# Patient Record
Sex: MALE | Race: BLACK OR AFRICAN AMERICAN | Employment: FULL TIME | URBAN - METROPOLITAN AREA
[De-identification: names, ages, dates, MRNs, and addresses within clinical notes are randomized per-mention and may not be internally consistent; named-entity substitution may affect disease eponyms.]

---

## 2017-01-09 RX ORDER — BACLOFEN 10 MG/1
TABLET ORAL
Qty: 60 TAB | Refills: 0 | Status: SHIPPED | OUTPATIENT
Start: 2017-01-09 | End: 2017-03-15

## 2017-01-09 RX ORDER — GLIMEPIRIDE 2 MG/1
TABLET ORAL
Qty: 30 TAB | Refills: 0 | Status: SHIPPED | OUTPATIENT
Start: 2017-01-09 | End: 2017-02-08 | Stop reason: SDUPTHER

## 2017-01-23 RX ORDER — METFORMIN HYDROCHLORIDE 500 MG/1
TABLET, EXTENDED RELEASE ORAL
Qty: 120 TAB | Refills: 0 | Status: SHIPPED | OUTPATIENT
Start: 2017-01-23 | End: 2017-02-27 | Stop reason: SDUPTHER

## 2017-02-08 RX ORDER — GLIMEPIRIDE 2 MG/1
TABLET ORAL
Qty: 30 TAB | Refills: 0 | Status: SHIPPED | OUTPATIENT
Start: 2017-02-08 | End: 2017-03-21 | Stop reason: SDUPTHER

## 2017-02-28 RX ORDER — METFORMIN HYDROCHLORIDE 500 MG/1
TABLET, EXTENDED RELEASE ORAL
Qty: 120 TAB | Refills: 0 | Status: SHIPPED | OUTPATIENT
Start: 2017-02-28 | End: 2017-04-06 | Stop reason: SDUPTHER

## 2017-03-03 ENCOUNTER — OFFICE VISIT (OUTPATIENT)
Dept: FAMILY MEDICINE CLINIC | Age: 57
End: 2017-03-03

## 2017-03-03 VITALS
TEMPERATURE: 97.9 F | HEIGHT: 66 IN | DIASTOLIC BLOOD PRESSURE: 88 MMHG | OXYGEN SATURATION: 98 % | BODY MASS INDEX: 27.98 KG/M2 | WEIGHT: 174.13 LBS | SYSTOLIC BLOOD PRESSURE: 128 MMHG | HEART RATE: 60 BPM | RESPIRATION RATE: 16 BRPM

## 2017-03-03 DIAGNOSIS — I10 ESSENTIAL HYPERTENSION: ICD-10-CM

## 2017-03-03 DIAGNOSIS — R00.2 PALPITATION: ICD-10-CM

## 2017-03-03 DIAGNOSIS — E78.00 HYPERCHOLESTEROLEMIA: ICD-10-CM

## 2017-03-03 DIAGNOSIS — E11.9 CONTROLLED TYPE 2 DIABETES MELLITUS WITHOUT COMPLICATION, WITHOUT LONG-TERM CURRENT USE OF INSULIN (HCC): Primary | ICD-10-CM

## 2017-03-03 RX ORDER — TOFACITINIB 11 MG/1
TABLET, FILM COATED, EXTENDED RELEASE ORAL
COMMUNITY
Start: 2017-03-01 | End: 2017-08-10

## 2017-03-03 NOTE — PROGRESS NOTES
HISTORY OF PRESENT ILLNESS  Bigg Goodwin is a 62 y.o. male. HPI  Cardiovascular Review:  The patient has hypertension and hyperlipidemia. Diet and Lifestyle: generally follows a low fat low cholesterol diet, generally follows a low sodium diet, sedentary, nonsmoker  Home BP Monitoring: is not measured at home. He admits to periods at night that he can feel heart beating hard without exertion, has not had cardiac work up in years. Not chest pain. Pertinent ROS: taking medications as instructed, no medication side effects noted, no TIA's, no chest pain on exertion, no dyspnea on exertion, no swelling of ankles. Diabetes Mellitus:  He has diabetes mellitus, and  hypertension and hyperlipidemia. Diabetic ROS - medication compliance: compliant all of the time, diabetic diet compliance: compliant most of the time, home glucose monitoring: is not performed. Lab review: orders written for new lab studies as appropriate; see orders. Patient Active Problem List    Diagnosis Date Noted    HTN (hypertension) 11/18/2014    Anxiety 09/03/2014    Diabetes mellitus type 2, controlled ([facility] 03/13/2012    Hypercholesterolemia 02/15/2011    [facility] 05/25/2010    ED (erectile dysfunction) 05/25/2010     Current Outpatient Prescriptions   Medication Sig Dispense Refill    XELJANZ XR 11 mg Tb24       metFORMIN ER (GLUCOPHAGE XR) 500 mg tablet TAKE TWO TABLETS BY MOUTH TWICE DAILY BEFORE  BREAKFAST  AND  DINNER 120 Tab 0    glimepiride (AMARYL) 2 mg tablet TAKE ONE TABLET BY MOUTH ONCE DAILY BEFORE  DINNER 30 Tab 0    baclofen (LIORESAL) 10 mg tablet TAKE ONE TABLET BY MOUTH THREE TIMES DAILY AS NEEDED FOR MUSCLE SPASM 60 Tab 0    canagliflozin (INVOKANA) 300 mg tablet Take 1 Tab by mouth Daily (before breakfast). 30 Tab 5    atorvastatin (LIPITOR) 20 mg tablet TAKE ONE TABLET BY MOUTH ONCE DAILY 30 Tab 5    lisinopril (PRINIVIL, ZESTRIL) 20 mg tablet Take 1 Tab by mouth daily.  30 Tab 5    valACYclovir (VALTREX) 500 mg tablet Take 1 Tab by mouth daily. 30 Tab 5    glucose blood VI test strips (ASCENSIA AUTODISC VI, ONE TOUCH ULTRA TEST VI) strip Bid monitoring dx: 250.00 100 Strip 11    Lancets Misc Monitor twice daily before meals dx: 250.00 2 Package 11    Blood-Glucose Meter (ONE TOUCH ULTRA SMART) monitoring kit by Does Not Apply route. Dx 250.00 1 Kit 0    zolpidem (AMBIEN) 10 mg tablet Take 1 Tab by mouth nightly as needed for Sleep. 27 Tab 0     Family History   Problem Relation Age of Onset    No Known Problems Mother     Arthritis-rheumatoid Father      Social History   Substance Use Topics    Smoking status: Former Smoker     Years: 2.00     Types: Cigarettes     Quit date: 10/10/2009    Smokeless tobacco: Never Used    Alcohol use 0.0 oz/week     0 Standard drinks or equivalent per week      Comment: occas. ROS  A comprehensive review of system was obtained and negative except findings in the HPI    Visit Vitals    /88    Pulse 60    Temp 97.9 °F (36.6 °C) (Oral)    Resp 16    Ht 5' 6\" (1.676 m)    Wt 174 lb 2 oz (79 kg)    SpO2 98%    BMI 28.1 kg/m2     Physical Exam   Constitutional: He is oriented to person, place, and time. He appears well-developed and well-nourished. No distress. Cardiovascular: Normal rate and regular rhythm. No murmur heard. Pulmonary/Chest: Breath sounds normal. No respiratory distress. He has no wheezes. Musculoskeletal: He exhibits no edema. Neurological: He is alert and oriented to person, place, and time. Psychiatric: He has a normal mood and affect. Nursing note and vitals reviewed. ASSESSMENT and PLAN  Encounter Diagnoses   Name Primary?     Controlled type 2 diabetes mellitus without complication, without long-term current use of insulin (HCC) Yes    Hypercholesterolemia     Essential hypertension     Palpitation      Orders Placed This Encounter    HEMOGLOBIN A1C WITH EAG    LIPID PANEL    METABOLIC PANEL, COMPREHENSIVE    CBC WITH AUTOMATED DIFF    MICROALBUMIN, UR, RAND W/ MICROALBUMIN/CREA RATIO    REFERRAL TO CARDIOLOGY    AMB POC EKG ROUTINE W/ 12 LEADS, INTER & REP    XELJANZ XR 11 mg Tb24     All labs updated today  EKG does show flattened T waves, will refer to cardio for eval  I have discussed the diagnosis with the patient and the intended plan as seen in the above orders. The patient has received an after-visit summary and questions were answered concerning future plans. Patient conveyed understanding of the plan at the time of the visit.     Honey Wang, MSN, ANP  3/3/2017

## 2017-03-03 NOTE — MR AVS SNAPSHOT
Visit Information Date & Time Provider Department Dept. Phone Encounter #  
 3/3/2017  8:15 AM Sarai Melvin, [facility] [phone] [id number] Upcoming Health Maintenance Date Due Hepatitis C Screening 1960 Pneumococcal 19-64 Medium Risk (1 of 1 - PPSV23) 1/12/1979 DTaP/Tdap/Td series (1 - Tdap) 1/12/1981 EYE EXAM RETINAL OR DILATED Q1 11/1/2013 FOOT EXAM Q1 11/27/2013 INFLUENZA AGE 9 TO ADULT 8/1/2016 HEMOGLOBIN A1C Q6M 3/28/2017 MICROALBUMIN Q1 9/28/2017 LIPID PANEL Q1 9/28/2017 COLONOSCOPY 7/23/2025 Allergies as of 3/3/2017  Review Complete On: 3/3/2017 By: Katty Hyatt LPN No Known Allergies Current Immunizations  Never Reviewed No immunizations on file. Not reviewed this visit You Were Diagnosed With   
  
 Codes Comments Controlled type 2 diabetes mellitus without complication, without long-term current use of insulin [facility]    -  Primary ICD-10-CM: E11.9 ICD-9-CM: 250.00 Hypercholesterolemia     ICD-10-CM: E78.00 ICD-9-CM: 272.0 Essential hypertension     ICD-10-CM: I10 
ICD-9-CM: 401.9 Palpitation     ICD-10-CM: R00.2 ICD-9-CM: 785.1 Vitals BP  
  
  
  
  
  
 128/88 Vitals History BMI and BSA Data Body Mass Index Body Surface Area  
 28.1 kg/m 2 1.92 m 2 Preferred Pharmacy Pharmacy Name Phone [facility] [address] [phone] Your Updated Medication List  
  
   
This list is accurate as of: 3/3/17  8:43 AM.  Always use your most recent med list.  
  
  
  
  
 atorvastatin 20 mg tablet Commonly known as:  LIPITOR  
TAKE ONE TABLET BY MOUTH ONCE DAILY  
  
 baclofen 10 mg tablet Commonly known as:  LIORESAL  
TAKE ONE TABLET BY MOUTH THREE TIMES DAILY AS NEEDED FOR MUSCLE SPASM Blood-Glucose Meter monitoring kit Commonly known as:  ONETOUCH ULTRA SMART by Does Not Apply route. Dx 250.00  
  
 canagliflozin 300 mg tablet Commonly known as:  Carlos Dyke Take 1 Tab by mouth Daily (before breakfast). glimepiride 2 mg tablet Commonly known as:  AMARYL  
TAKE ONE TABLET BY MOUTH ONCE DAILY BEFORE  DINNER  
  
 glucose blood VI test strips strip Commonly known as:  ASCENSIA AUTODISC VI, ONE TOUCH ULTRA TEST VI Bid monitoring dx: 250.00 Lancets Misc Monitor twice daily before meals dx: 250.00  
  
 lisinopril 20 mg tablet Commonly known as:  Kita Pinch Take 1 Tab by mouth daily. metFORMIN  mg tablet Commonly known as:  GLUCOPHAGE XR  
TAKE TWO TABLETS BY MOUTH TWICE DAILY BEFORE  BREAKFAST  AND  DINNER  
  
 valACYclovir 500 mg tablet Commonly known as:  VALTREX Take 1 Tab by mouth daily. XELJANZ XR 11 mg Tb24 Generic drug:  tofacitinib  
  
 zolpidem 10 mg tablet Commonly known as:  AMBIEN Take 1 Tab by mouth nightly as needed for Sleep. We Performed the Following AMB POC EKG ROUTINE W/ 12 LEADS, INTER & REP [97900 CPT(R)] CBC WITH AUTOMATED DIFF [27002 CPT(R)] HEMOGLOBIN A1C WITH EAG [68292 CPT(R)] LIPID PANEL [37423 CPT(R)] METABOLIC PANEL, COMPREHENSIVE [99428 CPT(R)] MICROALBUMIN, UR, RAND W/ MICROALBUMIN/CREA RATIO T5344406 CPT(R)] REFERRAL TO CARDIOLOGY [ZQD02 Custom] Comments:  
 Please evaluate patient for palpitations. Referral Information Referral ID Referred By Referred To  
  
 [id number] [address] [place] [address] Phone: [phone] Fax: [phone] Visits Status Start Date End Date 99 New Request 3/3/17 3/3/18 If your referral has a status of pending review or denied, additional information will be sent to support the outcome of this decision. Introducing [facility]! Dear Jolly Gilliam: Thank you for requesting a [employer] account. Our records indicate that you already have an active [employer] account. You can access your account anytime at [email or web address] Did you know that you can access your hospital and ER discharge instructions at any time in [employer]? You can also review all of your test results from your hospital stay or ER visit. Additional Information If you have questions, please visit the Frequently Asked Questions section of the [employer] website at [email or web address] Remember, [employer] is NOT to be used for urgent needs. For medical emergencies, dial 911. Now available from your iPhone and Android! Please provide this summary of care documentation to your next provider. Your primary care clinician is listed as Arielle Ly. If you have any questions after today's visit, please call [phone].

## 2017-03-03 NOTE — PROGRESS NOTES
1. Have you been to the ER, urgent care clinic since your last visit? Hospitalized since your last visit? No    2. Have you seen or consulted any other health care providers outside of the [address] [place] [address] since your last visit? Include any pap smears or colon screening.  Yes Rheumotologist x 2 wks for new med    Chief Complaint   Patient presents with    Follow-up     from Rheumatologist

## 2017-03-04 LAB
ALBUMIN SERPL-MCNC: 4.1 G/DL (ref 3.5–5.5)
ALBUMIN/CREAT UR: 31.5 MG/G CREAT (ref 0–30)
ALBUMIN/GLOB SERPL: 1.7 {RATIO} (ref 1.1–2.5)
ALP SERPL-CCNC: 63 IU/L (ref 39–117)
ALT SERPL-CCNC: 25 IU/L (ref 0–44)
AST SERPL-CCNC: 15 IU/L (ref 0–40)
BASOPHILS # BLD AUTO: 0 X10E3/UL (ref 0–0.2)
BASOPHILS NFR BLD AUTO: 0 %
BILIRUB SERPL-MCNC: 0.3 MG/DL (ref 0–1.2)
BUN SERPL-MCNC: 13 MG/DL (ref 6–24)
BUN/CREAT SERPL: 14 (ref 9–20)
CALCIUM SERPL-MCNC: 8.8 MG/DL (ref 8.7–10.2)
CHLORIDE SERPL-SCNC: 103 MMOL/L (ref 96–106)
CHOLEST SERPL-MCNC: 144 MG/DL (ref 100–199)
CO2 SERPL-SCNC: 22 MMOL/L (ref 18–29)
CREAT SERPL-MCNC: 0.92 MG/DL (ref 0.76–1.27)
CREAT UR-MCNC: 90.6 MG/DL
EOSINOPHIL # BLD AUTO: 0.1 X10E3/UL (ref 0–0.4)
EOSINOPHIL NFR BLD AUTO: 1 %
ERYTHROCYTE [DISTWIDTH] IN BLOOD BY AUTOMATED COUNT: 14 % (ref 12.3–15.4)
EST. AVERAGE GLUCOSE BLD GHB EST-MCNC: 123 MG/DL
GLOBULIN SER CALC-MCNC: 2.4 G/DL (ref 1.5–4.5)
GLUCOSE SERPL-MCNC: 140 MG/DL (ref 65–99)
HBA1C MFR BLD: 5.9 % (ref 4.8–5.6)
HCT VFR BLD AUTO: 39.3 % (ref 37.5–51)
HDLC SERPL-MCNC: 51 MG/DL
HGB BLD-MCNC: 12.8 G/DL (ref 12.6–17.7)
IMM GRANULOCYTES # BLD: 0 X10E3/UL (ref 0–0.1)
IMM GRANULOCYTES NFR BLD: 0 %
INTERPRETATION, 910389: NORMAL
LDLC SERPL CALC-MCNC: 81 MG/DL (ref 0–99)
LYMPHOCYTES # BLD AUTO: 1.1 X10E3/UL (ref 0.7–3.1)
LYMPHOCYTES NFR BLD AUTO: 14 %
Lab: NORMAL
MCH RBC QN AUTO: 28.5 PG (ref 26.6–33)
MCHC RBC AUTO-ENTMCNC: 32.6 G/DL (ref 31.5–35.7)
MCV RBC AUTO: 88 FL (ref 79–97)
MICROALBUMIN UR-MCNC: 28.5 UG/ML
MONOCYTES # BLD AUTO: 0.5 X10E3/UL (ref 0.1–0.9)
MONOCYTES NFR BLD AUTO: 6 %
NEUTROPHILS # BLD AUTO: 6 X10E3/UL (ref 1.4–7)
NEUTROPHILS NFR BLD AUTO: 79 %
PLATELET # BLD AUTO: 205 X10E3/UL (ref 150–379)
POTASSIUM SERPL-SCNC: 4.3 MMOL/L (ref 3.5–5.2)
PROT SERPL-MCNC: 6.5 G/DL (ref 6–8.5)
RBC # BLD AUTO: 4.49 X10E6/UL (ref 4.14–5.8)
SODIUM SERPL-SCNC: 141 MMOL/L (ref 134–144)
TRIGL SERPL-MCNC: 62 MG/DL (ref 0–149)
VLDLC SERPL CALC-MCNC: 12 MG/DL (ref 5–40)
WBC # BLD AUTO: 7.6 X10E3/UL (ref 3.4–10.8)

## 2017-03-15 ENCOUNTER — OFFICE VISIT (OUTPATIENT)
Dept: CARDIOLOGY CLINIC | Age: 57
End: 2017-03-15

## 2017-03-15 VITALS
WEIGHT: 176.2 LBS | BODY MASS INDEX: 28.32 KG/M2 | HEIGHT: 66 IN | SYSTOLIC BLOOD PRESSURE: 150 MMHG | DIASTOLIC BLOOD PRESSURE: 90 MMHG | HEART RATE: 62 BPM | RESPIRATION RATE: 18 BRPM

## 2017-03-15 DIAGNOSIS — R00.2 PALPITATIONS: ICD-10-CM

## 2017-03-15 DIAGNOSIS — I10 ESSENTIAL HYPERTENSION: Primary | ICD-10-CM

## 2017-03-15 DIAGNOSIS — E78.00 HYPERCHOLESTEROLEMIA: ICD-10-CM

## 2017-03-15 RX ORDER — IBUPROFEN 200 MG
TABLET ORAL DAILY
COMMUNITY
End: 2017-04-26 | Stop reason: SDUPTHER

## 2017-03-15 NOTE — MR AVS SNAPSHOT
Visit Information Date & Time Provider Department Dept. Phone Encounter #  
 3/15/2017  9:40 AM Franca Olivarez MD [facility] [place] [phone] [id number] Upcoming Health Maintenance Date Due Hepatitis C Screening 1960 Pneumococcal 19-64 Medium Risk (1 of 1 - PPSV23) 1/12/1979 DTaP/Tdap/Td series (1 - Tdap) 1/12/1981 EYE EXAM RETINAL OR DILATED Q1 11/1/2013 FOOT EXAM Q1 11/27/2013 INFLUENZA AGE 9 TO ADULT 8/1/2016 HEMOGLOBIN A1C Q6M 9/3/2017 MICROALBUMIN Q1 3/3/2018 LIPID PANEL Q1 3/3/2018 COLONOSCOPY 7/23/2025 Allergies as of 3/15/2017  Review Complete On: 3/15/2017 By: Franca Olivarez MD  
 No Known Allergies Current Immunizations  Never Reviewed No immunizations on file. Not reviewed this visit Vitals BP Pulse Resp Height(growth percentile) Weight(growth percentile) BMI  
 150/90 (BP 1 Location: Right arm, BP Patient Position: Sitting) 62 18 5' 6\" (1.676 m) 176 lb 3.2 oz (79.9 kg) 28.44 kg/m2 Smoking Status Former Smoker Vitals History BMI and BSA Data Body Mass Index Body Surface Area  
 28.44 kg/m 2 1.93 m 2 Preferred Pharmacy Pharmacy Name Phone [facility] [address] [place] [phone] Your Updated Medication List  
  
   
This list is accurate as of: 3/15/17 10:04 AM.  Always use your most recent med list.  
  
  
  
  
 atorvastatin 20 mg tablet Commonly known as:  LIPITOR  
TAKE ONE TABLET BY MOUTH ONCE DAILY  
  
 glimepiride 2 mg tablet Commonly known as:  AMARYL  
TAKE ONE TABLET BY MOUTH ONCE DAILY BEFORE  DINNER  
  
 ibuprofen 200 mg tablet Commonly known as:  MOTRIN Take  by mouth daily. lisinopril 20 mg tablet Commonly known as:  Cade Conrad Take 1 Tab by mouth daily. metFORMIN  mg tablet Commonly known as:  GLUCOPHAGE XR  
 TAKE TWO TABLETS BY MOUTH TWICE DAILY BEFORE  BREAKFAST  AND  DINNER  
  
 XELJANZ XR 11 mg Tb24 Generic drug:  tofacitinib  
  
 zolpidem 10 mg tablet Commonly known as:  AMBIEN Take 1 Tab by mouth nightly as needed for Sleep. Introducing [facility]! Dear Dmeetrio Castillo: 
Thank you for requesting a [employer] account. Our records indicate that you already have an active [employer] account. You can access your account anytime at [email or web address] Did you know that you can access your hospital and ER discharge instructions at any time in [employer]? You can also review all of your test results from your hospital stay or ER visit. Additional Information If you have questions, please visit the Frequently Asked Questions section of the [employer] website at [email or web address] Remember, [employer] is NOT to be used for urgent needs. For medical emergencies, dial 911. Now available from your iPhone and Android! Please provide this summary of care documentation to your next provider. Your primary care clinician is listed as Honey Wang. If you have any questions after today's visit, please call [phone].

## 2017-03-15 NOTE — PROGRESS NOTES
Visit Vitals    /90 (BP 1 Location: Right arm, BP Patient Position: Sitting)    Pulse 62    Resp 18    Ht 5' 6\" (1.676 m)    Wt 176 lb 3.2 oz (79.9 kg)    BMI 28.44 kg/m2     Reviewed and updated medications per Dr. Sienna Jackson.

## 2017-03-15 NOTE — PROGRESS NOTES
Lito Jim MD. [facility]              Patient: Olivia Cousin  : 1960      Today's Date: 3/15/2017            HISTORY OF PRESENT ILLNESS:     History of Present Illness:    Mr. Grupo Corley was referred for HTN and palpitations. He says late at night, watching TV he feels heart beats hard - maybe a little faster - he thinks it started after he started "[employer]" for RA - happens most night - lasts up to 30 min. Activity is limited due to RA, but still walks. No CP or SOB. PAST MEDICAL HISTORY:     Past Medical History:   Diagnosis Date    Diabetes [facility])     ED (erectile dysfunction) 2010    Hypertension     RA (rheumatoid arthritis) ([facility] 2010           MEDICATIONS:     Current Outpatient Prescriptions   Medication Sig Dispense Refill    ibuprofen (MOTRIN) 200 mg tablet Take  by mouth daily.  XELJANZ XR 11 mg Tb24       metFORMIN ER (GLUCOPHAGE XR) 500 mg tablet TAKE TWO TABLETS BY MOUTH TWICE DAILY BEFORE  BREAKFAST  AND  DINNER 120 Tab 0    glimepiride (AMARYL) 2 mg tablet TAKE ONE TABLET BY MOUTH ONCE DAILY BEFORE  DINNER 30 Tab 0    zolpidem (AMBIEN) 10 mg tablet Take 1 Tab by mouth nightly as needed for Sleep. 30 Tab 0    atorvastatin (LIPITOR) 20 mg tablet TAKE ONE TABLET BY MOUTH ONCE DAILY 30 Tab 5    lisinopril (PRINIVIL, ZESTRIL) 20 mg tablet Take 1 Tab by mouth daily. 30 Tab 5       No Known Allergies          SOCIAL HISTORY:     Social History   Substance Use Topics    Smoking status: Former Smoker     Years: 2.00     Types: Cigarettes     Quit date: 10/10/2009    Smokeless tobacco: Never Used    Alcohol use 0.0 oz/week     0 Standard drinks or equivalent per week      Comment: occas.            FAMILY HISTORY:     Family History   Problem Relation Age of Onset    No Known Problems Mother     Arthritis-rheumatoid Father              REVIEW OF SYMPTOMS:     Review of Symptoms:  Constitutional: Negative for fever, chills  HEENT: Negative for nosebleeds, tinnitus, and vision changes. Respiratory: Negative for cough, wheezing  Cardiovascular: Negative for orthopnea, claudication, syncope, and PND. Gastrointestinal: Negative for abdominal pain, diarrhea, melena. Genitourinary: Negative for dysuria  Musculoskeletal: + joint pain   Skin: Negative for rash  Heme: No problems bleeding. Neurological: Negative for speech change and focal weakness. PHYSICAL EXAM:     Physical Exam:  Visit Vitals    /90 (BP 1 Location: Right arm, BP Patient Position: Sitting)    Pulse 62    Resp 18    Ht 5' 6\" (1.676 m)    Wt 176 lb 3.2 oz (79.9 kg)    BMI 28.44 kg/m2     Patient appears generally well, mood and affect are appropriate and pleasant. HEENT:  Hearing intact, non-icteric, normocephalic, atraumatic. Neck Exam: Supple, No JVD or carotid bruits. Lung Exam: Clear to auscultation, even breath sounds. Cardiac Exam: Regular rate and rhythm with no murmur  Abdomen: Soft, non-tender, normal bowel sounds. No bruits or masses. Extremities: Moves all ext well. No lower extremity edema. Vascular: 2+ dorsalis pedis pulses bilaterally. Psych: Appropriate affect  Neuro - Grossly intact            LABS / OTHER STUDIES:       Lab Results   Component Value Date/Time    Sodium 141 03/03/2017 08:44 AM    Potassium 4.3 03/03/2017 08:44 AM    Chloride 103 03/03/2017 08:44 AM    CO2 22 03/03/2017 08:44 AM    Anion gap 10 08/17/2010 09:04 AM    Glucose 140 03/03/2017 08:44 AM    BUN 13 03/03/2017 08:44 AM    Creatinine 0.92 03/03/2017 08:44 AM    BUN/Creatinine ratio 14 03/03/2017 08:44 AM    GFR est  03/03/2017 08:44 AM    GFR est non-AA 92 03/03/2017 08:44 AM    Calcium 8.8 03/03/2017 08:44 AM    Bilirubin, total 0.3 03/03/2017 08:44 AM    AST (SGOT) 15 03/03/2017 08:44 AM    Alk.  phosphatase 63 03/03/2017 08:44 AM    Protein, total 6.5 03/03/2017 08:44 AM    Albumin 4.1 03/03/2017 08:44 AM    Globulin 3.7 01/27/2010 10:01 AM    A-G Ratio 1.7 03/03/2017 08:44 AM    ALT (SGPT) 25 03/03/2017 08:44 AM     Lab Results   Component Value Date/Time    WBC 7.6 03/03/2017 08:44 AM    HGB 12.8 03/03/2017 08:44 AM    HCT 39.3 03/03/2017 08:44 AM    PLATELET 056 99/04/0727 08:44 AM    MCV 88 03/03/2017 08:44 AM     Lab Results   Component Value Date/Time    Cholesterol, total 144 03/03/2017 08:44 AM    HDL Cholesterol 51 03/03/2017 08:44 AM    LDL, calculated 81 03/03/2017 08:44 AM    VLDL, calculated 12 03/03/2017 08:44 AM    Triglyceride 62 03/03/2017 08:44 AM    CHOL/HDL Ratio 5.9 01/27/2010 10:01 AM       Lab Results   Component Value Date/Time    TSH 0.895 09/28/2016 08:27 AM     Lab Results   Component Value Date/Time    Hemoglobin A1c 5.9 03/03/2017 08:44 AM             CARDIAC DIAGNOSTICS:     Cardiac Evaluation Includes:    Exercise Cardiolite 2/10 - No evidence of myocardium at ischemic risk.  Normal wall motion  with an EF of 61 %. EKG 3/3/17- NSR, non-specific T wave abn           ASSESSMENT AND PLAN:     Assessment and Plan:  1) Palpitations (heart pounding at night time)   - check an echo to assess heart structure   - check 30 day event monitor (he does not have symptoms daily) - he wants further testing to see what is going on     2) CV risk management   - He has higher CV risk given DM and RA  - cont statin for primary prevention   - consider ASA 81 mg daily if he is able to get off of routine NSAIDS    3) HTN  - BP he says usually OK, although a little high today  - consider adding Coreg if BP remains high (would also help palpitations)     4) Phone FU after testing. See me in 6 weeks. Patient expressed understanding of the plan - questions were answered. He is from [place], [place]. Nitish Lyons MD, [address], [facility] [address]  Ph: [phone]   Ph [phone]        ADDENDUM   4/21/2017  Echo 4/19/17 - mild LVH, LVEF 65-70%    Will have nurse call with echo results.

## 2017-03-16 ENCOUNTER — OFFICE VISIT (OUTPATIENT)
Dept: CARDIOLOGY CLINIC | Age: 57
End: 2017-03-16

## 2017-03-16 DIAGNOSIS — R00.2 PALPITATIONS: Primary | ICD-10-CM

## 2017-03-22 RX ORDER — GLIMEPIRIDE 2 MG/1
TABLET ORAL
Qty: 30 TAB | Refills: 0 | Status: SHIPPED | OUTPATIENT
Start: 2017-03-22 | End: 2017-04-26 | Stop reason: SDUPTHER

## 2017-04-06 RX ORDER — ATORVASTATIN CALCIUM 20 MG/1
TABLET, FILM COATED ORAL
Qty: 30 TAB | Refills: 0 | Status: SHIPPED | OUTPATIENT
Start: 2017-04-06 | End: 2017-05-09 | Stop reason: SDUPTHER

## 2017-04-06 RX ORDER — METFORMIN HYDROCHLORIDE 500 MG/1
TABLET, EXTENDED RELEASE ORAL
Qty: 120 TAB | Refills: 0 | Status: SHIPPED | OUTPATIENT
Start: 2017-04-06 | End: 2017-05-09 | Stop reason: SDUPTHER

## 2017-04-19 ENCOUNTER — CLINICAL SUPPORT (OUTPATIENT)
Dept: CARDIOLOGY CLINIC | Age: 57
End: 2017-04-19

## 2017-04-19 DIAGNOSIS — E78.00 HYPERCHOLESTEROLEMIA: ICD-10-CM

## 2017-04-19 DIAGNOSIS — R00.2 PALPITATIONS: ICD-10-CM

## 2017-04-19 DIAGNOSIS — I10 ESSENTIAL HYPERTENSION: ICD-10-CM

## 2017-04-24 ENCOUNTER — TELEPHONE (OUTPATIENT)
Dept: CARDIOLOGY CLINIC | Age: 57
End: 2017-04-24

## 2017-04-26 ENCOUNTER — OFFICE VISIT (OUTPATIENT)
Dept: CARDIOLOGY CLINIC | Age: 57
End: 2017-04-26

## 2017-04-26 VITALS
BODY MASS INDEX: 28.28 KG/M2 | HEIGHT: 66 IN | DIASTOLIC BLOOD PRESSURE: 90 MMHG | WEIGHT: 176 LBS | HEART RATE: 69 BPM | OXYGEN SATURATION: 95 % | RESPIRATION RATE: 19 BRPM | SYSTOLIC BLOOD PRESSURE: 140 MMHG

## 2017-04-26 DIAGNOSIS — R00.2 PALPITATIONS: ICD-10-CM

## 2017-04-26 DIAGNOSIS — I10 ESSENTIAL HYPERTENSION: Primary | ICD-10-CM

## 2017-04-26 DIAGNOSIS — E78.00 HYPERCHOLESTEROLEMIA: ICD-10-CM

## 2017-04-26 RX ORDER — IBUPROFEN 200 MG
200 TABLET ORAL
Qty: 30 TAB | Refills: 0
Start: 2017-04-26 | End: 2017-10-30 | Stop reason: DRUGHIGH

## 2017-04-26 RX ORDER — GLIMEPIRIDE 2 MG/1
TABLET ORAL
Qty: 30 TAB | Refills: 0 | Status: SHIPPED | OUTPATIENT
Start: 2017-04-26 | End: 2017-06-01 | Stop reason: SDUPTHER

## 2017-04-26 NOTE — PROGRESS NOTES
Visit Vitals    /83 (BP 1 Location: Left arm, BP Patient Position: Sitting)    Pulse 69    Resp 19    Ht 5' 6\" (1.676 m)    Wt 176 lb (79.8 kg)    SpO2 95%    BMI 28.41 kg/m2

## 2017-04-26 NOTE — PROGRESS NOTES
Doris Pitts MD. [facility]              Patient: Adriana Dotson  : 1960      Today's Date: 2017            HISTORY OF PRESENT ILLNESS:     History of Present Illness:  Mr. Blanca Khoury is here for follow-up. Just minimal palpitations - better. No CP or SOB. He feels well overall. PAST MEDICAL HISTORY:     Past Medical History:   Diagnosis Date    Diabetes [facility])     ED (erectile dysfunction) 2010    Hypertension     RA (rheumatoid arthritis) ([facility] [address] 2010         MEDICATIONS:     Current Outpatient Prescriptions   Medication Sig Dispense Refill    glimepiride (AMARYL) 2 mg tablet TAKE ONE TABLET BY MOUTH ONCE DAILY BEFORE  DINNER 30 Tab 0    ibuprofen (MOTRIN) 200 mg tablet Take 1 Tab by mouth every eight (8) hours as needed for Pain. 30 Tab 0    metFORMIN ER (GLUCOPHAGE XR) 500 mg tablet TAKE TWO TABLETS BY MOUTH TWICE DAILY BEFORE  BREAKFAST  AND  DINNER 120 Tab 0    atorvastatin (LIPITOR) 20 mg tablet TAKE ONE TABLET BY MOUTH ONCE DAILY 30 Tab 0    XELJANZ XR 11 mg Tb24       zolpidem (AMBIEN) 10 mg tablet Take 1 Tab by mouth nightly as needed for Sleep. 30 Tab 0    lisinopril (PRINIVIL, ZESTRIL) 20 mg tablet Take 1 Tab by mouth daily. 30 Tab 5       No Known Allergies          SOCIAL HISTORY:     Social History   Substance Use Topics    Smoking status: Former Smoker     Years: 2.00     Types: Cigarettes     Quit date: 10/10/2009    Smokeless tobacco: Never Used    Alcohol use 0.0 oz/week     0 Standard drinks or equivalent per week      Comment: occas. FAMILY HISTORY:     Family History   Problem Relation Age of Onset    No Known Problems Mother     Arthritis-rheumatoid Father             REVIEW OF SYMPTOMS:      Review of Symptoms:  Constitutional: Negative for fever, chills  HEENT: Negative for nosebleeds, tinnitus, and vision changes.    Respiratory: Negative for cough, wheezing  Cardiovascular: Negative for orthopnea, claudication, syncope, and PND. Gastrointestinal: Negative for abdominal pain, diarrhea, melena. Genitourinary: Negative for dysuria  Musculoskeletal: + joint pain   Skin: Negative for rash  Heme: No problems bleeding. Neurological: Negative for speech change and focal weakness.                  PHYSICAL EXAM:      Physical Exam:  Visit Vitals    /90 (BP 1 Location: Left arm, BP Patient Position: Sitting)    Pulse 69    Resp 19    Ht 5' 6\" (1.676 m)    Wt 176 lb (79.8 kg)    SpO2 95%    BMI 28.41 kg/m2       Patient appears generally well, mood and affect are appropriate and pleasant. HEENT: Hearing intact, non-icteric, normocephalic, atraumatic. Neck Exam: Supple  Lung Exam: Clear to auscultation, even breath sounds. Cardiac Exam: Regular rate and rhythm with no murmur  Abdomen: Soft, non-tender, normal bowel sounds. Extremities: Moves all ext well. No lower extremity edema. Psych: Appropriate affect  Neuro - Grossly intact                 LABS / OTHER STUDIES:               Lab Results   Component Value Date/Time     Sodium 141 03/03/2017 08:44 AM     Potassium 4.3 03/03/2017 08:44 AM     Chloride 103 03/03/2017 08:44 AM     CO2 22 03/03/2017 08:44 AM     Anion gap 10 08/17/2010 09:04 AM     Glucose 140 03/03/2017 08:44 AM     BUN 13 03/03/2017 08:44 AM     Creatinine 0.92 03/03/2017 08:44 AM     BUN/Creatinine ratio 14 03/03/2017 08:44 AM     GFR est  03/03/2017 08:44 AM     GFR est non-AA 92 03/03/2017 08:44 AM     Calcium 8.8 03/03/2017 08:44 AM     Bilirubin, total 0.3 03/03/2017 08:44 AM     AST (SGOT) 15 03/03/2017 08:44 AM     Alk.  phosphatase 63 03/03/2017 08:44 AM     Protein, total 6.5 03/03/2017 08:44 AM     Albumin 4.1 03/03/2017 08:44 AM     Globulin 3.7 01/27/2010 10:01 AM     A-G Ratio 1.7 03/03/2017 08:44 AM     ALT (SGPT) 25 03/03/2017 08:44 AM            Lab Results   Component Value Date/Time     WBC 7.6 03/03/2017 08:44 AM     HGB 12.8 03/03/2017 08:44 AM     HCT 39.3 03/03/2017 08:44 AM     PLATELET 719 17/67/0998 08:44 AM     MCV 88 03/03/2017 08:44 AM            Lab Results   Component Value Date/Time     Cholesterol, total 144 03/03/2017 08:44 AM     HDL Cholesterol 51 03/03/2017 08:44 AM     LDL, calculated 81 03/03/2017 08:44 AM     VLDL, calculated 12 03/03/2017 08:44 AM     Triglyceride 62 03/03/2017 08:44 AM     CHOL/HDL Ratio 5.9 01/27/2010 10:01 AM               Lab Results   Component Value Date/Time     TSH 0.895 09/28/2016 08:27 AM            Lab Results   Component Value Date/Time     Hemoglobin A1c 5.9 03/03/2017 08:44 AM                  CARDIAC DIAGNOSTICS:      Cardiac Evaluation Includes:     Exercise Cardiolite 2/10 - No evidence of myocardium at ischemic risk.  Normal wall motion with an EF of 61 %. Echo 4/19/17 - mild LVH, LVEF 65-70%  Event Monitor 3/20/17-4/18/17 - normal study      EKG 3/3/17- NSR, non-specific T wave abn               ASSESSMENT AND PLAN:      Assessment and Plan:  1) Palpitations (heart pounding at night time)   - He is doing better when I see him 4/26/17 with just minimal palpitations now   - Event Monitor 3/20/17-4/18/17 - normal study      2) CV risk management   - He has higher CV risk given DM and RA  - cont statin for primary prevention   - consider ASA 81 mg daily if he is able to get off of routine NSAIDS     3) HTN  - BP high again today 140/90  - I discussed options and he wants to follow BP more, before adding meds - is to see PCP soon. - If BP runs high, can add Coreg which would help with palpitations  - Follow BP at home - goal < 135/85     4) See me as needed. Patient expressed understanding of the plan - questions were answered. He is from [place], [place]. He is [address], [place], [address] [place] [address], [address] [address]  Ph: [phone]    [phone]

## 2017-06-02 RX ORDER — GLIMEPIRIDE 2 MG/1
TABLET ORAL
Qty: 30 TAB | Refills: 0 | Status: SHIPPED | OUTPATIENT
Start: 2017-06-02 | End: 2017-07-19 | Stop reason: SDUPTHER

## 2017-07-21 RX ORDER — GLIMEPIRIDE 2 MG/1
TABLET ORAL
Qty: 30 TAB | Refills: 0 | Status: SHIPPED | OUTPATIENT
Start: 2017-07-21 | End: 2017-09-11 | Stop reason: SDUPTHER

## 2017-08-10 ENCOUNTER — OFFICE VISIT (OUTPATIENT)
Dept: FAMILY MEDICINE CLINIC | Age: 57
End: 2017-08-10

## 2017-08-10 VITALS
TEMPERATURE: 98.7 F | WEIGHT: 173 LBS | RESPIRATION RATE: 16 BRPM | BODY MASS INDEX: 27.8 KG/M2 | OXYGEN SATURATION: 99 % | SYSTOLIC BLOOD PRESSURE: 133 MMHG | HEART RATE: 65 BPM | DIASTOLIC BLOOD PRESSURE: 85 MMHG | HEIGHT: 66 IN

## 2017-08-10 DIAGNOSIS — K52.9 GASTROENTERITIS: Primary | ICD-10-CM

## 2017-08-10 RX ORDER — ETANERCEPT 50 MG/ML
SOLUTION SUBCUTANEOUS
COMMUNITY
Start: 2017-08-03

## 2017-08-10 RX ORDER — ONDANSETRON 8 MG/1
8 TABLET, ORALLY DISINTEGRATING ORAL
Qty: 15 TAB | Refills: 0 | Status: SHIPPED | OUTPATIENT
Start: 2017-08-10 | End: 2017-10-30

## 2017-08-10 NOTE — MR AVS SNAPSHOT
Visit Information Date & Time Provider Department Dept. Phone Encounter #  
 8/10/2017 10:20 AM Yoni Chiang NP [address] [facility] [phone] [id number] Follow-up Instructions Return if symptoms worsen or fail to improve. Upcoming Health Maintenance Date Due Hepatitis C Screening 1960 Pneumococcal 19-64 Medium Risk (1 of 1 - PPSV23) 1/12/1979 DTaP/Tdap/Td series (1 - Tdap) 1/12/1981 EYE EXAM RETINAL OR DILATED Q1 11/1/2013 FOOT EXAM Q1 11/27/2013 INFLUENZA AGE 9 TO ADULT 8/1/2017 HEMOGLOBIN A1C Q6M 9/3/2017 MICROALBUMIN Q1 3/3/2018 LIPID PANEL Q1 3/3/2018 COLONOSCOPY 7/23/2025 Allergies as of 8/10/2017  Review Complete On: 8/10/2017 By: Yoni Chiang NP No Known Allergies Current Immunizations  Never Reviewed No immunizations on file. Not reviewed this visit You Were Diagnosed With   
  
 Codes Comments Gastroenteritis    -  Primary ICD-10-CM: K52.9 ICD-9-CM: 558. 9 Vitals BP Pulse Temp Resp Height(growth percentile) Weight(growth percentile) 133/85 65 98.7 °F (37.1 °C) (Oral) 16 5' 6\" (1.676 m) 173 lb (78.5 kg) SpO2 BMI Smoking Status 99% 27.92 kg/m2 Former Smoker Vitals History BMI and BSA Data Body Mass Index Body Surface Area  
 27.92 kg/m 2 1.91 m 2 Preferred Pharmacy Pharmacy Name Phone [facility] [address] [place] [address] [phone] Your Updated Medication List  
  
   
This list is accurate as of: 8/10/17 10:36 AM.  Always use your most recent med list.  
  
  
  
  
 atorvastatin 20 mg tablet Commonly known as:  LIPITOR  
TAKE ONE TABLET BY MOUTH ONCE DAILY ENBREL SURECLICK 50 mg/mL (0.86 mL) injection Generic drug:  etanercept  
  
 glimepiride 2 mg tablet Commonly known as:  AMARYL  
TAKE ONE TABLET BY MOUTH ONCE DAILY BEFORE  DINNER  
  
 ibuprofen 200 mg tablet Commonly known as:  MOTRIN  
 Take 1 Tab by mouth every eight (8) hours as needed for Pain. lisinopril 20 mg tablet Commonly known as:  PRINIVIL, ZESTRIL  
TAKE ONE TABLET BY MOUTH ONCE DAILY  
  
 metFORMIN  mg tablet Commonly known as:  GLUCOPHAGE XR  
TAKE TWO TABLETS BY MOUTH TWICE DAILY BEFORE  BREAKFAST  AND  DINNER  
  
 ondansetron 8 mg disintegrating tablet Commonly known as:  ZOFRAN ODT Take 1 Tab by mouth every eight (8) hours as needed for Nausea. zolpidem 10 mg tablet Commonly known as:  AMBIEN Take 1 Tab by mouth nightly as needed for Sleep. Prescriptions Sent to Pharmacy Refills  
 ondansetron (ZOFRAN ODT) 8 mg disintegrating tablet 0 Sig: Take 1 Tab by mouth every eight (8) hours as needed for Nausea. Class: Normal  
 Pharmacy: [address] [place] [address] 58, [facility] [facility] #: [phone] Route: Oral  
  
Follow-up Instructions Return if symptoms worsen or fail to improve. Patient Instructions Gastroenteritis: Care Instructions Your Care Instructions Gastroenteritis is an illness that may cause nausea, vomiting, and diarrhea. It is sometimes called \"stomach flu. \" It can be caused by bacteria or a virus. You will probably begin to feel better in 1 to 2 days. In the meantime, get plenty of rest and make sure you do not become dehydrated. Dehydration occurs when your body loses too much fluid. Follow-up care is a key part of your treatment and safety. Be sure to make and go to all appointments, and call your doctor if you are having problems. Its also a good idea to know your test results and keep a list of the medicines you take. How can you care for yourself at home? · If your doctor prescribed antibiotics, take them as directed. Do not stop taking them just because you feel better. You need to take the full course of antibiotics.  
· Drink plenty of fluids to prevent dehydration, enough so that your urine is light yellow or clear like water. Choose water and other caffeine-free clear liquids until you feel better. If you have kidney, heart, or liver disease and have to limit fluids, talk with your doctor before you increase your fluid intake. · Drink fluids slowly, in frequent, small amounts, because drinking too much too fast can cause vomiting. · Begin eating mild foods, such as dry toast, yogurt, applesauce, bananas, and rice. Avoid spicy, hot, or high-fat foods, and do not drink alcohol or caffeine for a day or two. Do not drink milk or eat ice cream until you are feeling better. How to prevent gastroenteritis · Keep hot foods hot and cold foods cold. · Do not eat meats, dressings, salads, or other foods that have been kept at room temperature for more than 2 hours. · Use a thermometer to check your refrigerator. It should be between 34°F and 40°F. 
· Defrost meats in the refrigerator or microwave, not on the kitchen counter. · Keep your hands and your kitchen clean. Wash your hands, cutting boards, and countertops with hot soapy water frequently. · Cook meat until it is well done. · Do not eat raw eggs or uncooked sauces made with raw eggs. · Do not take chances. If food looks or tastes spoiled, throw it out. When should you call for help? Call 911 anytime you think you may need emergency care. For example, call if: 
· You vomit blood or what looks like coffee grounds. · You passed out (lost consciousness). · You pass maroon or very bloody stools. Call your doctor now or seek immediate medical care if: 
· You have severe belly pain. · You have signs of needing more fluids. You have sunken eyes, a dry mouth, and pass only a little dark urine. · You feel like you are going to faint. · You have increased belly pain that does not go away in 1 to 2 days. · You have new or increased nausea, or you are vomiting. · You have a new or higher fever. · Your stools are black and tarlike or have streaks of blood. Watch closely for changes in your health, and be sure to contact your doctor if: 
· You are dizzy or lightheaded. · You urinate less than usual, or your urine is dark yellow or brown. · You do not feel better with each day that goes by. Where can you learn more? Go to http://shannan-hillary.info/. Enter N142 in the search box to learn more about \"Gastroenteritis: Care Instructions. \" Current as of: March 3, 2017 Content Version: 11.3 © 0205-1059 [employer]. Care instructions adapted under license by [employer] (which disclaims liability or warranty for this information). If you have questions about a medical condition or this instruction, always ask your healthcare professional. [address] any warranty or liability for your use of this information. Introducing [facility]! Dear Annia Pod: 
Thank you for requesting a [employer] account. Our records indicate that you already have an active [employer] account. You can access your account anytime at [email or web address] "[email or web address]"/[email or web address] Machine Did you know that you can access your hospital and ER discharge instructions at any time in [employer]? You can also review all of your test results from your hospital stay or ER visit. Additional Information If you have questions, please visit the Frequently Asked Questions section of the [employer] website at [email or web address] "[email or web address]"/[email or web address]/. Remember, [employer] is NOT to be used for urgent needs. For medical emergencies, dial 911. Now available from your iPhone and Android! Please provide this summary of care documentation to your next provider. Your primary care clinician is listed as Faye Cannon. If you have any questions after today's visit, please call [phone].

## 2017-08-10 NOTE — PROGRESS NOTES
1. Have you been to the ER, urgent care clinic since your last visit? Hospitalized since your last visit? No    2. Have you seen or consulted any other health care providers outside of the [address] [place] [address] since your last visit? Include any pap smears or colon screening.  No     Chief Complaint   Patient presents with    Vomiting     Can not hold in any food, x3 days    Diarrhea     x3 days

## 2017-08-10 NOTE — PROGRESS NOTES
Chief Complaint   Patient presents with    Vomiting     Can not hold in any food, x3 days    Diarrhea     x3 days     he is a 62y.o. year old male who presents for evalution. Pt has been having vomiting and diarrhea for past 3 days. Unable to keep down any food, even ginger ale and crackers. Is having some dizziness. Has been out of work since Tuesday, work in [place]. Requesting note for absence and return to work date of Monday. Reviewed PmHx, RxHx, FmHx, SocHx, AllgHx and updated and dated in the chart. Review of Systems - negative except as listed above in the HPI    Objective:     Vitals:    08/10/17 1028   BP: 133/85   Pulse: 65   Resp: 16   Temp: 98.7 °F (37.1 °C)   TempSrc: Oral   SpO2: 99%   Weight: 173 lb (78.5 kg)   Height: 5' 6\" (1.676 m)     Physical Examination: General appearance - alert, well appearing, and in no distress  Chest - clear to auscultation, no wheezes, rales or rhonchi, symmetric air entry  Heart - normal rate, regular rhythm, normal S1, S2, no murmurs, rubs, clicks or gallops  Abdomen - soft, nontender, nondistended, no masses or organomegaly  bowel sounds hyperactive    Assessment/ Plan:   Diagnoses and all orders for this visit:    1. Gastroenteritis  -     ondansetron (ZOFRAN ODT) 8 mg disintegrating tablet; Take 1 Tab by mouth every eight (8) hours as needed for Nausea. New rx. MACY diet, advance as tolerated. Low sugar gatorade will help with dizziness. F/U prn     Pt voiced understanding regarding plan of care. Follow-up Disposition:  Return if symptoms worsen or fail to improve. I have discussed the diagnosis with the patient and the intended plan as seen in the above orders. The patient has received an after-visit summary and questions were answered concerning future plans.      Medication Side Effects and Warnings were discussed with patient    Yoni Chiang NP Please review refill request:     Last OV: 1/10/17  Last Refill: 3/23/17  ZHANNA: Good until 5/20/17    Thank you,    Von

## 2017-08-10 NOTE — PATIENT INSTRUCTIONS
Gastroenteritis: Care Instructions  Your Care Instructions  Gastroenteritis is an illness that may cause nausea, vomiting, and diarrhea. It is sometimes called \"stomach flu. \" It can be caused by bacteria or a virus. You will probably begin to feel better in 1 to 2 days. In the meantime, get plenty of rest and make sure you do not become dehydrated. Dehydration occurs when your body loses too much fluid. Follow-up care is a key part of your treatment and safety. Be sure to make and go to all appointments, and call your doctor if you are having problems. Its also a good idea to know your test results and keep a list of the medicines you take. How can you care for yourself at home? · If your doctor prescribed antibiotics, take them as directed. Do not stop taking them just because you feel better. You need to take the full course of antibiotics. · Drink plenty of fluids to prevent dehydration, enough so that your urine is light yellow or clear like water. Choose water and other caffeine-free clear liquids until you feel better. If you have kidney, heart, or liver disease and have to limit fluids, talk with your doctor before you increase your fluid intake. · Drink fluids slowly, in frequent, small amounts, because drinking too much too fast can cause vomiting. · Begin eating mild foods, such as dry toast, yogurt, applesauce, bananas, and rice. Avoid spicy, hot, or high-fat foods, and do not drink alcohol or caffeine for a day or two. Do not drink milk or eat ice cream until you are feeling better. How to prevent gastroenteritis  · Keep hot foods hot and cold foods cold. · Do not eat meats, dressings, salads, or other foods that have been kept at room temperature for more than 2 hours. · Use a thermometer to check your refrigerator. It should be between 34°F and 40°F.  · Defrost meats in the refrigerator or microwave, not on the kitchen counter. · Keep your hands and your kitchen clean.  Wash your hands, cutting boards, and countertops with hot soapy water frequently. · Cook meat until it is well done. · Do not eat raw eggs or uncooked sauces made with raw eggs. · Do not take chances. If food looks or tastes spoiled, throw it out. When should you call for help? Call 911 anytime you think you may need emergency care. For example, call if:  · You vomit blood or what looks like coffee grounds. · You passed out (lost consciousness). · You pass maroon or very bloody stools. Call your doctor now or seek immediate medical care if:  · You have severe belly pain. · You have signs of needing more fluids. You have sunken eyes, a dry mouth, and pass only a little dark urine. · You feel like you are going to faint. · You have increased belly pain that does not go away in 1 to 2 days. · You have new or increased nausea, or you are vomiting. · You have a new or higher fever. · Your stools are black and tarlike or have streaks of blood. Watch closely for changes in your health, and be sure to contact your doctor if:  · You are dizzy or lightheaded. · You urinate less than usual, or your urine is dark yellow or brown. · You do not feel better with each day that goes by. Where can you learn more? Go to http://shannan-hillary.info/. Enter N142 in the search box to learn more about \"Gastroenteritis: Care Instructions. \"  Current as of: March 3, 2017  Content Version: 11.3  © 6529-8973 [employer]. Care instructions adapted under license by [employer] (which disclaims liability or warranty for this information). If you have questions about a medical condition or this instruction, always ask your healthcare professional. [address] any warranty or liability for your use of this information.

## 2017-08-10 NOTE — LETTER
8/10/2017 10:35 AM 
 
Mr. Alis Rao [address] 
[address] [address] Please excuse Mr. Elizabeth Cowan from work 8/8/17 - 8/13/17. He should be able to return to work 8/14/17. Sincerely, Heena Rausch NP

## 2017-09-11 RX ORDER — GLIMEPIRIDE 2 MG/1
TABLET ORAL
Qty: 30 TAB | Refills: 0 | Status: SHIPPED | OUTPATIENT
Start: 2017-09-11 | End: 2017-11-06 | Stop reason: SDUPTHER

## 2017-10-30 ENCOUNTER — OFFICE VISIT (OUTPATIENT)
Dept: FAMILY MEDICINE CLINIC | Age: 57
End: 2017-10-30

## 2017-10-30 VITALS
WEIGHT: 177 LBS | HEIGHT: 66 IN | BODY MASS INDEX: 28.45 KG/M2 | OXYGEN SATURATION: 97 % | TEMPERATURE: 98 F | DIASTOLIC BLOOD PRESSURE: 82 MMHG | SYSTOLIC BLOOD PRESSURE: 151 MMHG | HEART RATE: 61 BPM | RESPIRATION RATE: 18 BRPM

## 2017-10-30 DIAGNOSIS — M54.2 NECK PAIN ON LEFT SIDE: ICD-10-CM

## 2017-10-30 DIAGNOSIS — S16.1XXA NECK STRAIN, INITIAL ENCOUNTER: Primary | ICD-10-CM

## 2017-10-30 DIAGNOSIS — R30.0 DYSURIA: ICD-10-CM

## 2017-10-30 DIAGNOSIS — I10 ESSENTIAL HYPERTENSION: ICD-10-CM

## 2017-10-30 RX ORDER — FLUCONAZOLE 150 MG/1
150 TABLET ORAL DAILY
Qty: 1 TAB | Refills: 1 | Status: SHIPPED | OUTPATIENT
Start: 2017-10-30 | End: 2017-10-31

## 2017-10-30 RX ORDER — METHOCARBAMOL 750 MG/1
750 TABLET, FILM COATED ORAL
Qty: 60 TAB | Refills: 0 | Status: SHIPPED | OUTPATIENT
Start: 2017-10-30 | End: 2019-11-15 | Stop reason: ALTCHOICE

## 2017-10-30 RX ORDER — IBUPROFEN 800 MG/1
800 TABLET ORAL
Qty: 60 TAB | Refills: 1 | Status: SHIPPED | OUTPATIENT
Start: 2017-10-30

## 2017-10-30 NOTE — PROGRESS NOTES
Chief Complaint   Patient presents with    Neck Pain     Patient present during walk in clinic with neck pain that began one month ago. Denies injury to neck. Have been treating pain with ibuprofen; with slight relief noted. 1. Have you been to the ER, urgent care clinic since your last visit? Hospitalized since your last visit? No    2. Have you seen or consulted any other health care providers outside of the [address] [place] [address] since your last visit? Include any pap smears or colon screening.  No

## 2017-10-30 NOTE — MR AVS SNAPSHOT
Visit Information Date & Time Provider Department Dept. Phone Encounter #  
 10/30/2017  8:15 AM Philly Greenwood NP [address] [facility] [phone] [id number] Follow-up Instructions Return in about 1 week (around 11/6/2017) for recheck BP and update fasting labs. Upcoming Health Maintenance Date Due Hepatitis C Screening 1960 Pneumococcal 19-64 Medium Risk (1 of 1 - PPSV23) 1/12/1979 DTaP/Tdap/Td series (1 - Tdap) 1/12/1981 EYE EXAM RETINAL OR DILATED Q1 11/1/2013 FOOT EXAM Q1 11/27/2013 INFLUENZA AGE 9 TO ADULT 8/1/2017 HEMOGLOBIN A1C Q6M 9/3/2017 MICROALBUMIN Q1 3/3/2018 LIPID PANEL Q1 3/3/2018 COLONOSCOPY 7/23/2025 Allergies as of 10/30/2017  Review Complete On: 10/30/2017 By: Philly Greenwood NP No Known Allergies Current Immunizations  Never Reviewed No immunizations on file. Not reviewed this visit You Were Diagnosed With   
  
 Codes Comments Neck strain, initial encounter    -  Primary ICD-10-CM: S16. Donna Rangel ICD-9-CM: 847.0 Neck pain on left side     ICD-10-CM: M54.2 ICD-9-CM: 723.1 Dysuria     ICD-10-CM: R30.0 ICD-9-CM: 788.1 Essential hypertension     ICD-10-CM: I10 
ICD-9-CM: 401.9 Vitals BP Pulse Temp Resp Height(growth percentile) Weight(growth percentile) 151/82 (BP 1 Location: Right arm, BP Patient Position: Sitting) 61 98 °F (36.7 °C) (Oral) 18 5' 6\" (1.676 m) 177 lb (80.3 kg) SpO2 BMI Smoking Status 97% 28.57 kg/m2 Former Smoker Vitals History BMI and BSA Data Body Mass Index Body Surface Area 28.57 kg/m 2 1.93 m 2 Preferred Pharmacy Pharmacy Name Phone [facility] [address] [place] [place] [phone] Your Updated Medication List  
  
   
This list is accurate as of: 10/30/17  8:15 AM.  Always use your most recent med list.  
  
  
  
  
 atorvastatin 20 mg tablet Commonly known as:  LIPITOR  
TAKE ONE TABLET BY MOUTH ONCE DAILY ENBREL SURECLICK 50 mg/mL (3.29 mL) injection Generic drug:  etanercept  
  
 fluconazole 150 mg tablet Commonly known as:  DIFLUCAN Take 1 Tab by mouth daily for 1 day. glimepiride 2 mg tablet Commonly known as:  AMARYL  
TAKE ONE TABLET BY MOUTH ONCE DAILY BEFORE  DINNER  
  
 ibuprofen 800 mg tablet Commonly known as:  MOTRIN Take 1 Tab by mouth every eight (8) hours as needed for Pain. lisinopril 20 mg tablet Commonly known as:  PRINIVIL, ZESTRIL  
TAKE ONE TABLET BY MOUTH ONCE DAILY  
  
 metFORMIN  mg tablet Commonly known as:  GLUCOPHAGE XR  
TAKE TWO TABLETS BY MOUTH TWICE DAILY BEFORE  BREAKFAST  AND  DINNER  
  
 methocarbamol 750 mg tablet Commonly known as:  ROBAXIN Take 1 Tab by mouth three (3) times daily as needed. zolpidem 10 mg tablet Commonly known as:  AMBIEN Take 1 Tab by mouth nightly as needed for Sleep. Prescriptions Sent to Pharmacy Refills  
 fluconazole (DIFLUCAN) 150 mg tablet 1 Sig: Take 1 Tab by mouth daily for 1 day. Class: Normal  
 Pharmacy: [facility] [address] [place] [address] Ph #: [phone] Route: Oral  
 methocarbamol (ROBAXIN) 750 mg tablet 0 Sig: Take 1 Tab by mouth three (3) times daily as needed. Class: Normal  
 Pharmacy: [facility] [address] [place] [address] Ph #: [phone] Route: Oral  
 ibuprofen (MOTRIN) 800 mg tablet 1 Sig: Take 1 Tab by mouth every eight (8) hours as needed for Pain. Class: Normal  
 Pharmacy: [facility] [address] [place] [address] Ph #: [phone] Route: Oral  
  
We Performed the Following CT/NG/T.VAGINALIS AMPLIFICATION [id number] CPT(R)] Follow-up Instructions Return in about 1 week (around 11/6/2017) for recheck BP and update fasting labs. Patient Instructions Neck: Exercises Your Care Instructions Here are some examples of typical rehabilitation exercises for your condition. Start each exercise slowly. Ease off the exercise if you start to have pain. Your doctor or physical therapist will tell you when you can start these exercises and which ones will work best for you. How to do the exercises Neck stretch 1. This stretch works best if you keep your shoulder down as you lean away from it. To help you remember to do this, start by relaxing your shoulders and lightly holding on to your thighs or your chair. 2. Tilt your head toward your shoulder and hold for 15 to 30 seconds. Let the weight of your head stretch your muscles. 3. If you would like a little added stretch, use your hand to gently and steadily pull your head toward your shoulder. For example, keeping your right shoulder down, lean your head to the left. 4. Repeat 2 to 4 times toward each shoulder. Diagonal neck stretch 1. Turn your head slightly toward the direction you will be stretching, and tilt your head diagonally toward your chest and hold for 15 to 30 seconds. 2. If you would like a little added stretch, use your hand to gently and steadily pull your head forward on the diagonal. 
3. Repeat 2 to 4 times toward each side. Dorsal glide stretch The dorsal glide stretches the back of the neck. If you feel pain, do not glide so far back. Some people find this exercise easier to do while lying on their backs with an ice pack on the neck. 1. Sit or stand tall and look straight ahead. 2. Slowly tuck your chin as you glide your head backward over your body 3. Hold for a count of 6, and then relax for up to 10 seconds. 4. Repeat 8 to 12 times. Chest and shoulder stretch 1. Sit or stand tall and glide your head backward as in the dorsal glide stretch. 2. Raise both arms so that your hands are next to your ears.  
3. Take a deep breath, and as you breathe out, lower your elbows down and behind your back. You will feel your shoulder blades slide down and together, and at the same time you will feel a stretch across your chest and the front of your shoulders. 4. Hold for about 6 seconds, and then relax for up to 10 seconds. 5. Repeat 8 to 12 times. Strengthening: Hands on head 1. Move your head backward, forward, and side to side against gentle pressure from your hands, holding each position for about 6 seconds. 2. Repeat 8 to 12 times. Follow-up care is a key part of your treatment and safety. Be sure to make and go to all appointments, and call your doctor if you are having problems. It's also a good idea to know your test results and keep a list of the medicines you take. Where can you learn more? Go to http://shannan-hillary.info/. Enter P975 in the search box to learn more about \"Neck: Exercises. \" Current as of: March 21, 2017 Content Version: 11.4 © 7846-4547 [employer]. Care instructions adapted under license by [employer] (which disclaims liability or warranty for this information). If you have questions about a medical condition or this instruction, always ask your healthcare professional. [address] any warranty or liability for your use of this information. Introducing [facility]! Dear Iraida Noel: 
Thank you for requesting a Vidable account. Our records indicate that you already have an active Vidable account. You can access your account anytime at [email or web address] Did you know that you can access your hospital and ER discharge instructions at any time in Vidable? You can also review all of your test results from your hospital stay or ER visit. Additional Information If you have questions, please visit the Frequently Asked Questions section of the Vidable website at [email or web address] Remember, MyChart is NOT to be used for urgent needs. For medical emergencies, dial 911. Now available from your iPhone and Android! Please provide this summary of care documentation to your next provider. Your primary care clinician is listed as Moi Beltran. If you have any questions after today's visit, please call [phone].

## 2017-10-30 NOTE — PROGRESS NOTES
Chief Complaint   Patient presents with    Neck Pain     Patient present during walk in clinic with neck pain that began one month ago. Denies injury to neck. Have been treating pain with ibuprofen; with slight relief noted. Left sided neck pain that has been present for the last month. Comes and goes but occurs daily. Denies any aggravating or alleviating factors. Has been trying to rub the neck to relieve tension. Has noticed increased tingling in the fingers on the left side. Denies any history of trauma or injury to the neck. Has been taking motrin which helps a little bit. Pain worse at the end of the day. Pt works in a [employer] and has a high stress job. Pt states that his GF had a bad yeast infection. She received medication for this. Pt is \"feeling different. \"  Denies any pain with urination. Had noticed increased penile swelling after intercourse. Denies any rash or itchiness. Every now and then will have a \"weird sensation. \"    Denies any other concerns at this time. Chief Complaint   Patient presents with    Neck Pain     he is a 62y.o. year old male who presents for evalution. Reviewed PmHx, RxHx, FmHx, SocHx, AllgHx and updated and dated in the chart.     Review of Systems - negative except as listed above in the HPI    Objective:     Vitals:    10/30/17 0745   BP: 151/82   Pulse: 61   Resp: 18   Temp: 98 °F (36.7 °C)   TempSrc: Oral   SpO2: 97%   Weight: 177 lb (80.3 kg)   Height: 5' 6\" (1.676 m)     Physical Examination: General appearance - alert, well appearing, and in no distress  Eyes - pupils equal and reactive, extraocular eye movements intact  Ears - bilateral TM's and external ear canals normal  Nose - normal and patent, no erythema, discharge or polyps  Mouth - mucous membranes moist, pharynx normal without lesions  Neck - supple, no significant adenopathy, carotids upstroke normal bilaterally, no bruits  Chest - clear to auscultation, no wheezes, rales or rhonchi, symmetric air entry  Heart - normal rate, regular rhythm, normal S1, S2, no murmurs  Musculoskeletal - abnormal exam of left side of neck, reports pain with deep palpation of left cervical paraspinals, left trapezius, and left sternocleidomastoid; neck ROM intact but elicits pain with turning head to look left; shoulder ROM intact and does not cause pain;  strength 5/5, radial pulse 2+    Assessment/ Plan:   Diagnoses and all orders for this visit:    1. Neck strain, initial encounter / 2. Neck pain on left side  -     methocarbamol (ROBAXIN) 750 mg tablet; Take 1 Tab by mouth three (3) times daily as needed. -     ibuprofen (MOTRIN) 800 mg tablet; Take 1 Tab by mouth every eight (8) hours as needed for Pain. Start med regimen to calm down inflammation and muscle tension. Reinforced SEs/ADRs. Enc to alternate heat and ice. Rest for 24 hours then start stretching and exercising to strengthen the muscles and prevent further injury. Massage painful area to relieve muscle tension. Work on good body mechanics, avoid heavy lifting. RTC if sx persist or worsen. 3. Dysuria  -     CT/NG/T.VAGINALIS AMPLIFICATION  -     fluconazole (DIFLUCAN) 150 mg tablet; Take 1 Tab by mouth daily for 1 day. Will notify results and deviate plan based on findings. Complete diflucan as directed. 4. Essential hypertension  BP up today. Recheck in 1 week and come fasting to update all labs. Follow-up Disposition:  Return in about 1 week (around 11/6/2017) for recheck BP and update fasting labs. I have discussed the diagnosis with the patient and the intended plan as seen in the above orders. The patient has received an after-visit summary and questions were answered concerning future plans.      Medication Side Effects and Warnings were discussed with patient: yes  Patient Labs were reviewed and or requested: yes  Patient Past Records were reviewed and or requested  yes  Patient / Caregiver Understanding of treatment plan was verbalized during office visit YES    LUCÍA Don    Patient Instructions        Neck: Exercises  Your Care Instructions  Here are some examples of typical rehabilitation exercises for your condition. Start each exercise slowly. Ease off the exercise if you start to have pain. Your doctor or physical therapist will tell you when you can start these exercises and which ones will work best for you. How to do the exercises  Neck stretch    1. This stretch works best if you keep your shoulder down as you lean away from it. To help you remember to do this, start by relaxing your shoulders and lightly holding on to your thighs or your chair. 2. Tilt your head toward your shoulder and hold for 15 to 30 seconds. Let the weight of your head stretch your muscles. 3. If you would like a little added stretch, use your hand to gently and steadily pull your head toward your shoulder. For example, keeping your right shoulder down, lean your head to the left. 4. Repeat 2 to 4 times toward each shoulder. Diagonal neck stretch    1. Turn your head slightly toward the direction you will be stretching, and tilt your head diagonally toward your chest and hold for 15 to 30 seconds. 2. If you would like a little added stretch, use your hand to gently and steadily pull your head forward on the diagonal.  3. Repeat 2 to 4 times toward each side. Dorsal glide stretch    The dorsal glide stretches the back of the neck. If you feel pain, do not glide so far back. Some people find this exercise easier to do while lying on their backs with an ice pack on the neck. 1. Sit or stand tall and look straight ahead. 2. Slowly tuck your chin as you glide your head backward over your body  3. Hold for a count of 6, and then relax for up to 10 seconds. 4. Repeat 8 to 12 times. Chest and shoulder stretch    1. Sit or stand tall and glide your head backward as in the dorsal glide stretch.   2. Raise both arms so that your hands are next to your ears. 3. Take a deep breath, and as you breathe out, lower your elbows down and behind your back. You will feel your shoulder blades slide down and together, and at the same time you will feel a stretch across your chest and the front of your shoulders. 4. Hold for about 6 seconds, and then relax for up to 10 seconds. 5. Repeat 8 to 12 times. Strengthening: Hands on head    1. Move your head backward, forward, and side to side against gentle pressure from your hands, holding each position for about 6 seconds. 2. Repeat 8 to 12 times. Follow-up care is a key part of your treatment and safety. Be sure to make and go to all appointments, and call your doctor if you are having problems. It's also a good idea to know your test results and keep a list of the medicines you take. Where can you learn more? Go to http://shannan-hillary.info/. Enter P975 in the search box to learn more about \"Neck: Exercises. \"  Current as of: March 21, 2017  Content Version: 11.4  © 1896-1780 Healthwise, Incorporated. Care instructions adapted under license by [employer] (which disclaims liability or warranty for this information). If you have questions about a medical condition or this instruction, always ask your healthcare professional. [address] any warranty or liability for your use of this information.

## 2017-10-30 NOTE — PATIENT INSTRUCTIONS

## 2017-10-31 LAB
C TRACH RRNA SPEC QL NAA+PROBE: NEGATIVE
N GONORRHOEA RRNA SPEC QL NAA+PROBE: NEGATIVE
T VAGINALIS RRNA SPEC QL NAA+PROBE: NEGATIVE

## 2017-10-31 NOTE — PROGRESS NOTES
The following message was sent to pt via kaleo portal in reference to lab results:    Good afternoon Mr. Melgar Lies,     Attached are the results of your urine sample. You are negative for chlamydia, gonorrhea, and trichomonas. Hopefully things clear up with taking the Diflucan that was called in to the pharmacy for you. Keep me posted if not!     Tyrone Villalba, HOMEP-C

## 2017-11-06 ENCOUNTER — OFFICE VISIT (OUTPATIENT)
Dept: FAMILY MEDICINE CLINIC | Age: 57
End: 2017-11-06

## 2017-11-06 VITALS
OXYGEN SATURATION: 100 % | SYSTOLIC BLOOD PRESSURE: 124 MMHG | DIASTOLIC BLOOD PRESSURE: 72 MMHG | RESPIRATION RATE: 16 BRPM | TEMPERATURE: 97.8 F | HEIGHT: 66 IN | HEART RATE: 75 BPM | BODY MASS INDEX: 28.16 KG/M2 | WEIGHT: 175.25 LBS

## 2017-11-06 DIAGNOSIS — R42 VERTIGO: ICD-10-CM

## 2017-11-06 DIAGNOSIS — I10 ESSENTIAL HYPERTENSION: ICD-10-CM

## 2017-11-06 DIAGNOSIS — E11.9 CONTROLLED TYPE 2 DIABETES MELLITUS WITHOUT COMPLICATION, WITHOUT LONG-TERM CURRENT USE OF INSULIN (HCC): ICD-10-CM

## 2017-11-06 DIAGNOSIS — E78.00 HYPERCHOLESTEROLEMIA: ICD-10-CM

## 2017-11-06 DIAGNOSIS — Z00.00 WELL ADULT EXAM: Primary | ICD-10-CM

## 2017-11-06 DIAGNOSIS — Z12.5 SCREENING PSA (PROSTATE SPECIFIC ANTIGEN): ICD-10-CM

## 2017-11-06 RX ORDER — GLIMEPIRIDE 2 MG/1
TABLET ORAL
Qty: 30 TAB | Refills: 0 | Status: SHIPPED | OUTPATIENT
Start: 2017-11-06 | End: 2017-12-13 | Stop reason: SDUPTHER

## 2017-11-06 RX ORDER — MECLIZINE HYDROCHLORIDE 25 MG/1
25 TABLET ORAL
Qty: 60 TAB | Refills: 1 | Status: SHIPPED | OUTPATIENT
Start: 2017-11-06

## 2017-11-06 NOTE — MR AVS SNAPSHOT
Visit Information Date & Time Provider Department Dept. Phone Encounter #  
 11/6/2017 10:15 AM Galdino Berman NP [address] [facility] [phone] [id number] Upcoming Health Maintenance Date Due Hepatitis C Screening 1960 Pneumococcal 19-64 Medium Risk (1 of 1 - PPSV23) 1/12/1979 DTaP/Tdap/Td series (1 - Tdap) 1/12/1981 EYE EXAM RETINAL OR DILATED Q1 11/1/2013 FOOT EXAM Q1 11/27/2013 INFLUENZA AGE 9 TO ADULT 8/1/2017 HEMOGLOBIN A1C Q6M 9/3/2017 MICROALBUMIN Q1 3/3/2018 LIPID PANEL Q1 3/3/2018 COLONOSCOPY 7/23/2025 Allergies as of 11/6/2017  Review Complete On: 11/6/2017 By: Scooter Vergara LPN No Known Allergies Current Immunizations  Never Reviewed No immunizations on file. Not reviewed this visit You Were Diagnosed With   
  
 Codes Comments Well adult exam    -  Primary ICD-10-CM: Z00.00 ICD-9-CM: V70.0 Vertigo     ICD-10-CM: T09 ICD-9-CM: 780.4 Controlled type 2 diabetes mellitus without complication, without long-term current use of insulin ([facility]     ICD-10-CM: E11.9 ICD-9-CM: 250.00 Hypercholesterolemia     ICD-10-CM: E78.00 ICD-9-CM: 272.0 Essential hypertension     ICD-10-CM: I10 
ICD-9-CM: 401.9 Screening PSA (prostate specific antigen)     ICD-10-CM: Z12.5 ICD-9-CM: V76.44 Vitals BP Pulse Temp Resp Height(growth percentile) Weight(growth percentile) 124/72 75 97.8 °F (36.6 °C) (Oral) 16 5' 6\" (1.676 m) 175 lb 4 oz (79.5 kg) SpO2 BMI Smoking Status 100% 28.29 kg/m2 Former Smoker Vitals History BMI and BSA Data Body Mass Index Body Surface Area  
 28.29 kg/m 2 1.92 m 2 Preferred Pharmacy Pharmacy Name Phone [facility] - [place], [address] [phone] Your Updated Medication List  
  
   
This list is accurate as of: 11/6/17 10:40 AM.  Always use your most recent med list.  
  
  
  
  
 atorvastatin 20 mg tablet Commonly known as:  LIPITOR  
TAKE ONE TABLET BY MOUTH ONCE DAILY ENBREL SURECLICK 50 mg/mL (0.86 mL) injection Generic drug:  etanercept  
  
 glimepiride 2 mg tablet Commonly known as:  AMARYL  
TAKE ONE TABLET BY MOUTH ONCE DAILY BEFORE  DINNER  
  
 ibuprofen 800 mg tablet Commonly known as:  MOTRIN Take 1 Tab by mouth every eight (8) hours as needed for Pain. lisinopril 20 mg tablet Commonly known as:  PRINIVIL, ZESTRIL  
TAKE ONE TABLET BY MOUTH ONCE DAILY  
  
 meclizine 25 mg tablet Commonly known as:  ANTIVERT Take 1 Tab by mouth three (3) times daily as needed. For vertigo  
  
 metFORMIN  mg tablet Commonly known as:  GLUCOPHAGE XR  
TAKE TWO TABLETS BY MOUTH TWICE DAILY BEFORE  BREAKFAST  AND  DINNER  
  
 methocarbamol 750 mg tablet Commonly known as:  ROBAXIN Take 1 Tab by mouth three (3) times daily as needed. zolpidem 10 mg tablet Commonly known as:  AMBIEN Take 1 Tab by mouth nightly as needed for Sleep. Prescriptions Sent to Pharmacy Refills  
 meclizine (ANTIVERT) 25 mg tablet 1 Sig: Take 1 Tab by mouth three (3) times daily as needed. For vertigo Class: Normal  
 Pharmacy: [address] #: [phone] Route: Oral  
  
We Performed the Following CBC WITH AUTOMATED DIFF [47832 CPT(R)] HEMOGLOBIN A1C WITH EAG [57950 CPT(R)] LIPID PANEL [80331 CPT(R)] METABOLIC PANEL, COMPREHENSIVE [31533 CPT(R)] MICROALBUMIN, UR, RAND W/ MICROALBUMIN/CREA RATIO [id number] CPT(R)] PSA, DIAGNOSTIC (PROSTATE SPECIFIC AG) [id number] CPT(R)] TSH 3RD GENERATION [19820 CPT(R)] Introducing [facility]! Dear Lizzy Mendosa: 
Thank you for requesting a CytoPherx account. Our records indicate that you already have an active CytoPherx account. You can access your account anytime at [email or web address] Did you know that you can access your hospital and ER discharge instructions at any time in Bumble Beez? You can also review all of your test results from your hospital stay or ER visit. Additional Information If you have questions, please visit the Frequently Asked Questions section of the Bumble Beez website at [email or web address] Remember, Bumble Beez is NOT to be used for urgent needs. For medical emergencies, dial 911. Now available from your iPhone and Android! Please provide this summary of care documentation to your next provider. Your primary care clinician is listed as Mark Archer. If you have any questions after today's visit, please call [phone].

## 2017-11-06 NOTE — PROGRESS NOTES
1. Have you been to the ER, urgent care clinic since your last visit? Hospitalized since your last visit? No    2. Have you seen or consulted any other health care providers outside of the [address] [place] [address] since your last visit? Include any pap smears or colon screening.  No    Chief Complaint   Patient presents with    Follow-up     1 wk f/u, neck strain

## 2017-11-06 NOTE — PROGRESS NOTES
Subjective: Marian Myers is a 62 y.o. male presenting for his annual checkup. ROS:  Feeling well. No dyspnea or chest pain on exertion. No abdominal pain, change in bowel habits, black or bloody stools. No urinary tract or prostatic symptoms. No neurological complaints. Specific concerns today: having sudden onset of vertigo 1 week ago, dizziness that comes and goes, worse by evening, has not been sick or have ear pain/allergies. Patient Active Problem List    Diagnosis Date Noted    HTN (hypertension) 11/18/2014    Anxiety 09/03/2014    Diabetes mellitus type 2, controlled ([facility] 03/13/2012    Hypercholesterolemia 02/15/2011    RA (rheumatoid arthritis) ([facility] 05/25/2010    ED (erectile dysfunction) 05/25/2010     Current Outpatient Prescriptions   Medication Sig Dispense Refill    glimepiride (AMARYL) 2 mg tablet TAKE ONE TABLET BY MOUTH ONCE DAILY BEFORE  DINNER 30 Tab 0    meclizine (ANTIVERT) 25 mg tablet Take 1 Tab by mouth three (3) times daily as needed. For vertigo 60 Tab 1    methocarbamol (ROBAXIN) 750 mg tablet Take 1 Tab by mouth three (3) times daily as needed. 60 Tab 0    ibuprofen (MOTRIN) 800 mg tablet Take 1 Tab by mouth every eight (8) hours as needed for Pain. 60 Tab 1    ENBREL SURECLICK 50 mg/mL (4.97 mL) injection       atorvastatin (LIPITOR) 20 mg tablet TAKE ONE TABLET BY MOUTH ONCE DAILY 30 Tab 5    metFORMIN ER (GLUCOPHAGE XR) 500 mg tablet TAKE TWO TABLETS BY MOUTH TWICE DAILY BEFORE  BREAKFAST  AND  DINNER 120 Tab 5    lisinopril (PRINIVIL, ZESTRIL) 20 mg tablet TAKE ONE TABLET BY MOUTH ONCE DAILY 30 Tab 5    zolpidem (AMBIEN) 10 mg tablet Take 1 Tab by mouth nightly as needed for Sleep.  27 Tab 0     Family History   Problem Relation Age of Onset    No Known Problems Mother     Arthritis-rheumatoid Father      Social History   Substance Use Topics    Smoking status: Former Smoker     Years: 2.00     Types: Cigarettes     Quit date: 10/10/2009   [place] Smokeless tobacco: Never Used    Alcohol use 0.0 oz/week     0 Standard drinks or equivalent per week      Comment: occas. Objective:     Visit Vitals    /72    Pulse 75    Temp 97.8 °F (36.6 °C) (Oral)    Resp 16    Ht 5' 6\" (1.676 m)    Wt 175 lb 4 oz (79.5 kg)    SpO2 100%    BMI 28.29 kg/m2     The patient appears well, alert, oriented x 3, in no distress. ENT normal. TM without redness or effusions bilaterally. Neck supple. No adenopathy or thyromegaly. ZANA. Lungs are clear, good air entry, no wheezes, rhonchi or rales. S1 and S2 normal, no murmurs, regular rate and rhythm. Abdomen is soft without tenderness, guarding, mass or organomegaly.  exam: HERNIA EXAM: no hernias found on exam.  Extremities show no edema, normal peripheral pulses. Neurological is normal without focal findings. Assessment/Plan:   healthy adult male  lose weight, increase physical activity, follow low fat diet, follow low salt diet, routine labs ordered. Encounter Diagnoses   Name Primary?  Well adult exam Yes    Vertigo     Controlled type 2 diabetes mellitus without complication, without long-term current use of insulin (HCC)     Hypercholesterolemia     Essential hypertension     Screening PSA (prostate specific antigen)      Orders Placed This Encounter    CBC WITH AUTOMATED DIFF    LIPID PANEL    METABOLIC PANEL, COMPREHENSIVE    HEMOGLOBIN A1C WITH EAG    TSH 3RD GENERATION    PROSTATE SPECIFIC AG    MICROALBUMIN, UR, RAND W/ MICROALBUMIN/CREA RATIO    meclizine (ANTIVERT) 25 mg tablet   . Labs updated today  Given antivert for vertigo, reviewed treatment course and what to expect  Recheck labs in 6 mo fasting pending results  I have discussed the diagnosis with the patient and the intended plan as seen in the above orders. The patient has received an after-visit summary and questions were answered concerning future plans.  Patient conveyed understanding of the plan at the time of the visit.    Brigida Montana, MSN, ANP  11/6/2017

## 2017-11-07 LAB
ALBUMIN SERPL-MCNC: 4.1 G/DL (ref 3.5–5.5)
ALBUMIN/CREAT UR: 36.2 MG/G CREAT (ref 0–30)
ALBUMIN/GLOB SERPL: 1.3 {RATIO} (ref 1.2–2.2)
ALP SERPL-CCNC: 77 IU/L (ref 39–117)
ALT SERPL-CCNC: 16 IU/L (ref 0–44)
AST SERPL-CCNC: 12 IU/L (ref 0–40)
BASOPHILS # BLD AUTO: 0 X10E3/UL (ref 0–0.2)
BASOPHILS NFR BLD AUTO: 1 %
BILIRUB SERPL-MCNC: 0.3 MG/DL (ref 0–1.2)
BUN SERPL-MCNC: 10 MG/DL (ref 6–24)
BUN/CREAT SERPL: 11 (ref 9–20)
CALCIUM SERPL-MCNC: 8.7 MG/DL (ref 8.7–10.2)
CHLORIDE SERPL-SCNC: 105 MMOL/L (ref 96–106)
CHOLEST SERPL-MCNC: 162 MG/DL (ref 100–199)
CO2 SERPL-SCNC: 23 MMOL/L (ref 18–29)
CREAT SERPL-MCNC: 0.95 MG/DL (ref 0.76–1.27)
CREAT UR-MCNC: 76.8 MG/DL
EOSINOPHIL # BLD AUTO: 0.1 X10E3/UL (ref 0–0.4)
EOSINOPHIL NFR BLD AUTO: 2 %
ERYTHROCYTE [DISTWIDTH] IN BLOOD BY AUTOMATED COUNT: 13 % (ref 12.3–15.4)
EST. AVERAGE GLUCOSE BLD GHB EST-MCNC: 128 MG/DL
GFR SERPLBLD CREATININE-BSD FMLA CKD-EPI: 102 ML/MIN/1.73
GFR SERPLBLD CREATININE-BSD FMLA CKD-EPI: 88 ML/MIN/1.73
GLOBULIN SER CALC-MCNC: 3.1 G/DL (ref 1.5–4.5)
GLUCOSE SERPL-MCNC: 116 MG/DL (ref 65–99)
HBA1C MFR BLD: 6.1 % (ref 4.8–5.6)
HCT VFR BLD AUTO: 41.4 % (ref 37.5–51)
HDLC SERPL-MCNC: 38 MG/DL
HGB BLD-MCNC: 13.5 G/DL (ref 12.6–17.7)
IMM GRANULOCYTES # BLD: 0 X10E3/UL (ref 0–0.1)
IMM GRANULOCYTES NFR BLD: 0 %
INTERPRETATION, 910389: NORMAL
LDLC SERPL CALC-MCNC: 101 MG/DL (ref 0–99)
LYMPHOCYTES # BLD AUTO: 1.6 X10E3/UL (ref 0.7–3.1)
LYMPHOCYTES NFR BLD AUTO: 35 %
Lab: NORMAL
MCH RBC QN AUTO: 27.8 PG (ref 26.6–33)
MCHC RBC AUTO-ENTMCNC: 32.6 G/DL (ref 31.5–35.7)
MCV RBC AUTO: 85 FL (ref 79–97)
MICROALBUMIN UR-MCNC: 27.8 UG/ML
MONOCYTES # BLD AUTO: 0.3 X10E3/UL (ref 0.1–0.9)
MONOCYTES NFR BLD AUTO: 7 %
NEUTROPHILS # BLD AUTO: 2.5 X10E3/UL (ref 1.4–7)
NEUTROPHILS NFR BLD AUTO: 55 %
PLATELET # BLD AUTO: 150 X10E3/UL (ref 150–379)
POTASSIUM SERPL-SCNC: 4.2 MMOL/L (ref 3.5–5.2)
PROT SERPL-MCNC: 7.2 G/DL (ref 6–8.5)
PSA SERPL-MCNC: 1.4 NG/ML (ref 0–4)
RBC # BLD AUTO: 4.85 X10E6/UL (ref 4.14–5.8)
SODIUM SERPL-SCNC: 146 MMOL/L (ref 134–144)
TRIGL SERPL-MCNC: 115 MG/DL (ref 0–149)
TSH SERPL DL<=0.005 MIU/L-ACNC: 1.25 UIU/ML (ref 0.45–4.5)
VLDLC SERPL CALC-MCNC: 23 MG/DL (ref 5–40)
WBC # BLD AUTO: 4.6 X10E3/UL (ref 3.4–10.8)

## 2017-12-13 RX ORDER — GLIMEPIRIDE 2 MG/1
TABLET ORAL
Qty: 30 TAB | Refills: 0 | Status: SHIPPED | OUTPATIENT
Start: 2017-12-13 | End: 2018-02-07 | Stop reason: SDUPTHER

## 2018-02-07 RX ORDER — GLIMEPIRIDE 2 MG/1
TABLET ORAL
Qty: 30 TAB | Refills: 0 | Status: SHIPPED | OUTPATIENT
Start: 2018-02-07 | End: 2018-04-11 | Stop reason: SDUPTHER

## 2018-03-07 RX ORDER — ATORVASTATIN CALCIUM 20 MG/1
TABLET, FILM COATED ORAL
Qty: 30 TAB | Refills: 5 | Status: SHIPPED | OUTPATIENT
Start: 2018-03-07 | End: 2019-05-06 | Stop reason: SDUPTHER

## 2018-03-07 RX ORDER — LISINOPRIL 20 MG/1
TABLET ORAL
Qty: 30 TAB | Refills: 5 | Status: SHIPPED | OUTPATIENT
Start: 2018-03-07 | End: 2019-05-06 | Stop reason: SDUPTHER

## 2018-04-12 RX ORDER — GLIMEPIRIDE 2 MG/1
TABLET ORAL
Qty: 30 TAB | Refills: 0 | Status: SHIPPED | OUTPATIENT
Start: 2018-04-12 | End: 2018-06-11 | Stop reason: SDUPTHER

## 2018-06-11 RX ORDER — GLIMEPIRIDE 2 MG/1
TABLET ORAL
Qty: 30 TAB | Refills: 0 | Status: SHIPPED | OUTPATIENT
Start: 2018-06-11 | End: 2018-08-13 | Stop reason: SDUPTHER

## 2018-06-11 RX ORDER — METFORMIN HYDROCHLORIDE 500 MG/1
TABLET, EXTENDED RELEASE ORAL
Qty: 120 TAB | Refills: 5 | Status: SHIPPED | OUTPATIENT
Start: 2018-06-11 | End: 2019-07-05 | Stop reason: SDUPTHER

## 2018-08-07 ENCOUNTER — OFFICE VISIT (OUTPATIENT)
Dept: FAMILY MEDICINE CLINIC | Age: 58
End: 2018-08-07

## 2018-08-07 VITALS
TEMPERATURE: 98.4 F | HEIGHT: 66 IN | DIASTOLIC BLOOD PRESSURE: 85 MMHG | SYSTOLIC BLOOD PRESSURE: 153 MMHG | HEART RATE: 63 BPM | OXYGEN SATURATION: 98 % | RESPIRATION RATE: 18 BRPM | BODY MASS INDEX: 27.97 KG/M2 | WEIGHT: 174 LBS

## 2018-08-07 DIAGNOSIS — I10 ESSENTIAL HYPERTENSION: ICD-10-CM

## 2018-08-07 DIAGNOSIS — E11.9 CONTROLLED TYPE 2 DIABETES MELLITUS WITHOUT COMPLICATION, WITHOUT LONG-TERM CURRENT USE OF INSULIN (HCC): Primary | ICD-10-CM

## 2018-08-07 DIAGNOSIS — M54.50 CHRONIC BILATERAL LOW BACK PAIN WITHOUT SCIATICA: ICD-10-CM

## 2018-08-07 DIAGNOSIS — L30.9 ECZEMA, UNSPECIFIED TYPE: ICD-10-CM

## 2018-08-07 DIAGNOSIS — G89.29 CHRONIC BILATERAL LOW BACK PAIN WITHOUT SCIATICA: ICD-10-CM

## 2018-08-07 DIAGNOSIS — E78.00 HYPERCHOLESTEROLEMIA: ICD-10-CM

## 2018-08-07 PROBLEM — E11.21 TYPE 2 DIABETES WITH NEPHROPATHY (HCC): Status: ACTIVE | Noted: 2018-08-07

## 2018-08-07 RX ORDER — MOMETASONE FUROATE 1 MG/G
CREAM TOPICAL
Qty: 45 G | Refills: 1 | Status: SHIPPED | OUTPATIENT
Start: 2018-08-07 | End: 2019-11-15 | Stop reason: SDUPTHER

## 2018-08-07 NOTE — MR AVS SNAPSHOT
[address] [place] [address] 
[phone] Patient: Christiana Polk MRN:  [id number] Visit Information Date & Time Provider Department Dept. Phone Encounter #  
 8/7/2018  9:00 AM Whit Arizmendi NP [facility] [phone] [id number] Follow-up Instructions Return for well exam after 11/6/18. Upcoming Health Maintenance Date Due Hepatitis C Screening 1960 Pneumococcal 19-64 Medium Risk (1 of 1 - PPSV23) 1/12/1979 DTaP/Tdap/Td series (1 - Tdap) 1/12/1981 FOOT EXAM Q1 11/27/2013 HEMOGLOBIN A1C Q6M 5/6/2018 Influenza Age 5 to Adult 8/1/2018 EYE EXAM RETINAL OR DILATED Q1 10/24/2018 MICROALBUMIN Q1 11/6/2018 LIPID PANEL Q1 11/6/2018 COLONOSCOPY 7/23/2025 Allergies as of 8/7/2018  Review Complete On: 8/7/2018 By: Whit Arizmendi NP No Known Allergies Current Immunizations  Never Reviewed No immunizations on file. Not reviewed this visit You Were Diagnosed With   
  
 Codes Comments Controlled type 2 diabetes mellitus without complication, without long-term current use of insulin ([facility]    -  Primary ICD-10-CM: E11.9 ICD-9-CM: 250.00 Hypercholesterolemia     ICD-10-CM: E78.00 ICD-9-CM: 272.0 Essential hypertension     ICD-10-CM: I10 
ICD-9-CM: 401.9 Eczema, unspecified type     ICD-10-CM: L30.9 ICD-9-CM: 692.9 Chronic bilateral low back pain without sciatica     ICD-10-CM: M54.5, G89.29 ICD-9-CM: 724.2, 338.29 Vitals BP Pulse Temp Resp Height(growth percentile) Weight(growth percentile) 153/85 (BP 1 Location: Right arm, BP Patient Position: Sitting) 63 98.4 °F (36.9 °C) (Oral) 18 5' 6\" (1.676 m) 174 lb (78.9 kg) SpO2 BMI Smoking Status 98% 28.08 kg/m2 Former Smoker Vitals History BMI and BSA Data Body Mass Index Body Surface Area  28.08 kg/m 2 1.92 m 2  
  
  
 Preferred Pharmacy Pharmacy Name Phone [address] [facility] [address] [place] [phone] Your Updated Medication List  
  
   
This list is accurate as of 8/7/18  9:26 AM.  Always use your most recent med list.  
  
  
  
  
 atorvastatin 20 mg tablet Commonly known as:  LIPITOR  
TAKE ONE TABLET BY MOUTH ONCE DAILY ENBREL SURECLICK 50 mg/mL (7.51 mL) injection Generic drug:  etanercept  
  
 glimepiride 2 mg tablet Commonly known as:  AMARYL  
TAKE 1 TABLET BY MOUTH ONCE DAILY BEFORE  DINNER  
  
 ibuprofen 800 mg tablet Commonly known as:  MOTRIN Take 1 Tab by mouth every eight (8) hours as needed for Pain. lisinopril 20 mg tablet Commonly known as:  PRINIVIL, ZESTRIL  
TAKE ONE TABLET BY MOUTH ONCE DAILY  
  
 meclizine 25 mg tablet Commonly known as:  ANTIVERT Take 1 Tab by mouth three (3) times daily as needed. For vertigo  
  
 metFORMIN  mg tablet Commonly known as:  GLUCOPHAGE XR  
TAKE TWO TABLETS BY MOUTH TWICE DAILY BEFORE  BREAKFAST  AND  DINNER  
  
 methocarbamol 750 mg tablet Commonly known as:  ROBAXIN Take 1 Tab by mouth three (3) times daily as needed. mometasone 0.1 % topical cream  
Commonly known as:  Kristin Ar Apply  to affected area two (2) times daily as needed for Skin Irritation. zolpidem 10 mg tablet Commonly known as:  AMBIEN Take 1 Tab by mouth nightly as needed for Sleep. Prescriptions Sent to Pharmacy Refills  
 mometasone (ELOCON) 0.1 % topical cream 1 Sig: Apply  to affected area two (2) times daily as needed for Skin Irritation. Class: Normal  
 Pharmacy: [address] [place] [address] [address] [place] Ph #: [phone] Route: Topical  
  
We Performed the Following CBC WITH AUTOMATED DIFF [00821 CPT(R)] HEMOGLOBIN A1C WITH EAG [12543 CPT(R)] LIPID PANEL [87368 CPT(R)] METABOLIC PANEL, COMPREHENSIVE [89495 CPT(R)] MICROALBUMIN, UR, RAND W/ MICROALB/CREAT RATIO [id number] CPT(R)] Follow-up Instructions Return for well exam after 11/6/18. To-Do List   
 08/10/2018 Imaging:  XR SPINE LUMB 2 OR 3 V Introducing [facility] SERVICES! Dear Desiree Wilhelm: 
Thank you for requesting a [employer] account. Our records indicate that you already have an active [employer] account. You can access your account anytime at [email or web address] Did you know that you can access your hospital and ER discharge instructions at any time in [employer]? You can also review all of your test results from your hospital stay or ER visit. Additional Information If you have questions, please visit the Frequently Asked Questions section of the [employer] website at [email or web address] Remember, [employer] is NOT to be used for urgent needs. For medical emergencies, dial 911. Now available from your iPhone and Android! Please provide this summary of care documentation to your next provider. Your primary care clinician is listed as Arielle Ly. If you have any questions after today's visit, please call [phone].

## 2018-08-07 NOTE — PROGRESS NOTES
Chief Complaint   Patient presents with    Cholesterol Problem    Diabetes    Hypertension    Labs     Patient in office today for f/u and fasting labs. Have no concerns. 1. Have you been to the ER, urgent care clinic since your last visit? Hospitalized since your last visit? No    2. Have you seen or consulted any other health care providers outside of the [facility] since your last visit? Include any pap smears or colon screening.  No

## 2018-08-07 NOTE — PROGRESS NOTES
HISTORY OF PRESENT ILLNESS  Nahid Platt is a 62 y.o. male. HPI  Cardiovascular Review:  The patient has hypertension and hyperlipidemia. Diet and Lifestyle: generally follows a low fat low cholesterol diet, generally follows a low sodium diet, exercises sporadically, nonsmoker  Home BP Monitoring: is not measured at home. Pertinent ROS: taking medications as instructed, no medication side effects noted, no TIA's, no chest pain on exertion, no dyspnea on exertion, no swelling of ankles. Diabetes Mellitus:  He has diabetes mellitus, and  hypertension and hyperlipidemia. Diabetic ROS - medication compliance: compliant all of the time, diabetic diet compliance: compliant all of the time, home glucose monitoring: is not performed. Lab review: orders written for new lab studies as appropriate; see orders. Lumbar spine pain:  Chronic low back pain ivette without sciatica  Has known DDD of the neck  Has never had low back work up    Patient Active Problem List    Diagnosis Date Noted    Type 2 diabetes with nephropathy ([facility] 08/07/2018    HTN (hypertension) 11/18/2014    Anxiety 09/03/2014    Diabetes mellitus type 2, controlled ([facility] 03/13/2012    Hypercholesterolemia 02/15/2011    RA (rheumatoid arthritis) ([facility] 05/25/2010    ED (erectile dysfunction) 05/25/2010     Current Outpatient Prescriptions   Medication Sig Dispense Refill    mometasone (ELOCON) 0.1 % topical cream Apply  to affected area two (2) times daily as needed for Skin Irritation.  45 g 1    glimepiride (AMARYL) 2 mg tablet TAKE 1 TABLET BY MOUTH ONCE DAILY BEFORE  DINNER 30 Tab 0    metFORMIN ER (GLUCOPHAGE XR) 500 mg tablet TAKE TWO TABLETS BY MOUTH TWICE DAILY BEFORE  BREAKFAST  AND  DINNER 120 Tab 5    atorvastatin (LIPITOR) 20 mg tablet TAKE ONE TABLET BY MOUTH ONCE DAILY 30 Tab 5    lisinopril (PRINIVIL, ZESTRIL) 20 mg tablet TAKE ONE TABLET BY MOUTH ONCE DAILY 30 Tab 5    meclizine (ANTIVERT) 25 mg tablet Take 1 Tab by mouth three (3) times daily as needed. For vertigo 60 Tab 1    ibuprofen (MOTRIN) 800 mg tablet Take 1 Tab by mouth every eight (8) hours as needed for Pain. 60 Tab 1    ENBREL SURECLICK 50 mg/mL (2.61 mL) injection       zolpidem (AMBIEN) 10 mg tablet Take 1 Tab by mouth nightly as needed for Sleep. 30 Tab 0    methocarbamol (ROBAXIN) 750 mg tablet Take 1 Tab by mouth three (3) times daily as needed. 61 Tab 0     Family History   Problem Relation Age of Onset    No Known Problems Mother     Arthritis-rheumatoid Father      Social History   Substance Use Topics    Smoking status: Former Smoker     Years: 2.00     Types: Cigarettes     Quit date: 10/10/2009    Smokeless tobacco: Never Used    Alcohol use 0.0 oz/week     0 Standard drinks or equivalent per week      Comment: occas. ROS  A comprehensive review of system was obtained and negative except findings in the HPI    Visit Vitals    /85 (BP 1 Location: Right arm, BP Patient Position: Sitting)    Pulse 63    Temp 98.4 °F (36.9 °C) (Oral)    Resp 18    Ht 5' 6\" (1.676 m)    Wt 174 lb (78.9 kg)    SpO2 98%    BMI 28.08 kg/m2     Physical Exam   Constitutional: He is oriented to person, place, and time. He appears well-developed and well-nourished. No distress. Cardiovascular: Normal rate and regular rhythm. No murmur heard. Pulmonary/Chest: Breath sounds normal. No respiratory distress. He has no wheezes. Musculoskeletal: He exhibits no edema. Neurological: He is alert and oriented to person, place, and time. Nursing note and vitals reviewed. ASSESSMENT and PLAN  Encounter Diagnoses   Name Primary?     Controlled type 2 diabetes mellitus without complication, without long-term current use of insulin (HCC) Yes    Hypercholesterolemia     Essential hypertension     Eczema, unspecified type     Chronic bilateral low back pain without sciatica      Orders Placed This Encounter    XR SPINE LUMB 2 OR 3 V    CBC WITH AUTOMATED DIFF    LIPID PANEL    METABOLIC PANEL, COMPREHENSIVE    HEMOGLOBIN A1C WITH EAG    MICROALBUMIN, UR, RAND W/ MICROALB/CREAT RATIO    mometasone (ELOCON) 0.1 % topical cream     Given refills of elocon cream for bid use  Labs updated today  Order given for xray of the lumbar spine  Dev plan with labs    I have discussed the diagnosis with the patient and the intended plan as seen in the above orders. The patient has received an after-visit summary and questions were answered concerning future plans. Patient conveyed understanding of the plan at the time of the visit.     Whit Arizmendi, MSN, ANP  8/7/2018

## 2018-08-08 LAB
ALBUMIN SERPL-MCNC: 4.4 G/DL (ref 3.5–5.5)
ALBUMIN/CREAT UR: 19.8 MG/G CREAT (ref 0–30)
ALBUMIN/GLOB SERPL: 1.5 {RATIO} (ref 1.2–2.2)
ALP SERPL-CCNC: 92 IU/L (ref 39–117)
ALT SERPL-CCNC: 24 IU/L (ref 0–44)
AST SERPL-CCNC: 14 IU/L (ref 0–40)
BASOPHILS # BLD AUTO: 0 X10E3/UL (ref 0–0.2)
BASOPHILS NFR BLD AUTO: 0 %
BILIRUB SERPL-MCNC: 0.4 MG/DL (ref 0–1.2)
BUN SERPL-MCNC: 12 MG/DL (ref 6–24)
BUN/CREAT SERPL: 12 (ref 9–20)
CALCIUM SERPL-MCNC: 9.6 MG/DL (ref 8.7–10.2)
CHLORIDE SERPL-SCNC: 105 MMOL/L (ref 96–106)
CHOLEST SERPL-MCNC: 169 MG/DL (ref 100–199)
CO2 SERPL-SCNC: 23 MMOL/L (ref 20–29)
CREAT SERPL-MCNC: 1.01 MG/DL (ref 0.76–1.27)
CREAT UR-MCNC: 124.8 MG/DL
EOSINOPHIL # BLD AUTO: 0.2 X10E3/UL (ref 0–0.4)
EOSINOPHIL NFR BLD AUTO: 3 %
ERYTHROCYTE [DISTWIDTH] IN BLOOD BY AUTOMATED COUNT: 13.9 % (ref 12.3–15.4)
EST. AVERAGE GLUCOSE BLD GHB EST-MCNC: 154 MG/DL
GLOBULIN SER CALC-MCNC: 2.9 G/DL (ref 1.5–4.5)
GLUCOSE SERPL-MCNC: 145 MG/DL (ref 65–99)
HBA1C MFR BLD: 7 % (ref 4.8–5.6)
HCT VFR BLD AUTO: 45.2 % (ref 37.5–51)
HDLC SERPL-MCNC: 43 MG/DL
HGB BLD-MCNC: 14.8 G/DL (ref 13–17.7)
IMM GRANULOCYTES # BLD: 0 X10E3/UL (ref 0–0.1)
IMM GRANULOCYTES NFR BLD: 0 %
INTERPRETATION, 910389: NORMAL
LDLC SERPL CALC-MCNC: 101 MG/DL (ref 0–99)
LYMPHOCYTES # BLD AUTO: 1.8 X10E3/UL (ref 0.7–3.1)
LYMPHOCYTES NFR BLD AUTO: 39 %
Lab: NORMAL
MCH RBC QN AUTO: 29 PG (ref 26.6–33)
MCHC RBC AUTO-ENTMCNC: 32.7 G/DL (ref 31.5–35.7)
MCV RBC AUTO: 89 FL (ref 79–97)
MICROALBUMIN UR-MCNC: 24.7 UG/ML
MONOCYTES # BLD AUTO: 0.3 X10E3/UL (ref 0.1–0.9)
MONOCYTES NFR BLD AUTO: 7 %
NEUTROPHILS # BLD AUTO: 2.3 X10E3/UL (ref 1.4–7)
NEUTROPHILS NFR BLD AUTO: 51 %
PLATELET # BLD AUTO: 133 X10E3/UL (ref 150–379)
POTASSIUM SERPL-SCNC: 4.5 MMOL/L (ref 3.5–5.2)
PROT SERPL-MCNC: 7.3 G/DL (ref 6–8.5)
RBC # BLD AUTO: 5.1 X10E6/UL (ref 4.14–5.8)
SODIUM SERPL-SCNC: 143 MMOL/L (ref 134–144)
TRIGL SERPL-MCNC: 127 MG/DL (ref 0–149)
VLDLC SERPL CALC-MCNC: 25 MG/DL (ref 5–40)
WBC # BLD AUTO: 4.5 X10E3/UL (ref 3.4–10.8)

## 2018-08-09 NOTE — PROGRESS NOTES
Hey there, your labs overall look great.  Your sugar is up some so make sure you are watching the diet for your sweets and carbs, recheck 3 months, Sarah Molina

## 2018-08-15 ENCOUNTER — HOSPITAL ENCOUNTER (OUTPATIENT)
Dept: GENERAL RADIOLOGY | Age: 58
Discharge: HOME | End: 2018-08-15
Payer: COMMERCIAL

## 2018-08-15 DIAGNOSIS — G89.29 CHRONIC BILATERAL LOW BACK PAIN WITHOUT SCIATICA: ICD-10-CM

## 2018-08-15 DIAGNOSIS — M54.50 CHRONIC BILATERAL LOW BACK PAIN WITHOUT SCIATICA: ICD-10-CM

## 2018-08-15 PROCEDURE — 72100 X-RAY EXAM L-S SPINE 2/3 VWS: CPT

## 2018-08-15 NOTE — PROGRESS NOTES
Hey there, your xray shows some mild disc disease of the low back but no severe disc bulges.  Mike Tobar

## 2018-11-14 ENCOUNTER — OFFICE VISIT (OUTPATIENT)
Dept: FAMILY MEDICINE CLINIC | Age: 58
End: 2018-11-14

## 2018-11-14 VITALS
BODY MASS INDEX: 28.61 KG/M2 | TEMPERATURE: 98.2 F | WEIGHT: 178 LBS | HEART RATE: 72 BPM | OXYGEN SATURATION: 98 % | DIASTOLIC BLOOD PRESSURE: 88 MMHG | SYSTOLIC BLOOD PRESSURE: 151 MMHG | RESPIRATION RATE: 17 BRPM | HEIGHT: 66 IN

## 2018-11-14 DIAGNOSIS — E78.00 HYPERCHOLESTEROLEMIA: ICD-10-CM

## 2018-11-14 DIAGNOSIS — I10 ESSENTIAL HYPERTENSION: ICD-10-CM

## 2018-11-14 DIAGNOSIS — E11.9 CONTROLLED TYPE 2 DIABETES MELLITUS WITHOUT COMPLICATION, WITHOUT LONG-TERM CURRENT USE OF INSULIN (HCC): ICD-10-CM

## 2018-11-14 DIAGNOSIS — Z00.00 WELL ADULT EXAM: Primary | ICD-10-CM

## 2018-11-14 DIAGNOSIS — R41.3 MEMORY LOSS: ICD-10-CM

## 2018-11-14 DIAGNOSIS — M06.9 RHEUMATOID ARTHRITIS INVOLVING MULTIPLE SITES, UNSPECIFIED RHEUMATOID FACTOR PRESENCE: ICD-10-CM

## 2018-11-14 NOTE — PROGRESS NOTES
Chief Complaint   Patient presents with    Complete Physical        1. Have you been to the ER, urgent care clinic since your last visit? Hospitalized since your last visit? No    2. Have you seen or consulted any other health care providers outside of the [address] [place] [address] since your last visit? Include any pap smears or colon screening.  No

## 2018-11-15 LAB
25(OH)D3+25(OH)D2 SERPL-MCNC: 8.2 NG/ML (ref 30–100)
ALBUMIN SERPL-MCNC: 4.2 G/DL (ref 3.5–5.5)
ALBUMIN/CREAT UR: 24 MG/G CREAT (ref 0–30)
ALBUMIN/GLOB SERPL: 1.6 {RATIO} (ref 1.2–2.2)
ALP SERPL-CCNC: 94 IU/L (ref 39–117)
ALT SERPL-CCNC: 25 IU/L (ref 0–44)
AST SERPL-CCNC: 17 IU/L (ref 0–40)
BASOPHILS # BLD AUTO: 0 X10E3/UL (ref 0–0.2)
BASOPHILS NFR BLD AUTO: 1 %
BILIRUB SERPL-MCNC: 0.3 MG/DL (ref 0–1.2)
BUN SERPL-MCNC: 12 MG/DL (ref 6–24)
BUN/CREAT SERPL: 12 (ref 9–20)
CALCIUM SERPL-MCNC: 9.1 MG/DL (ref 8.7–10.2)
CHLORIDE SERPL-SCNC: 107 MMOL/L (ref 96–106)
CHOLEST SERPL-MCNC: 185 MG/DL (ref 100–199)
CO2 SERPL-SCNC: 22 MMOL/L (ref 20–29)
CREAT SERPL-MCNC: 1.03 MG/DL (ref 0.76–1.27)
CREAT UR-MCNC: 105.8 MG/DL
EOSINOPHIL # BLD AUTO: 0.2 X10E3/UL (ref 0–0.4)
EOSINOPHIL NFR BLD AUTO: 4 %
ERYTHROCYTE [DISTWIDTH] IN BLOOD BY AUTOMATED COUNT: 13.2 % (ref 12.3–15.4)
EST. AVERAGE GLUCOSE BLD GHB EST-MCNC: 180 MG/DL
FOLATE SERPL-MCNC: 14 NG/ML
GLOBULIN SER CALC-MCNC: 2.7 G/DL (ref 1.5–4.5)
GLUCOSE SERPL-MCNC: 192 MG/DL (ref 65–99)
HBA1C MFR BLD: 7.9 % (ref 4.8–5.6)
HCT VFR BLD AUTO: 42.8 % (ref 37.5–51)
HDLC SERPL-MCNC: 38 MG/DL
HGB BLD-MCNC: 13.5 G/DL (ref 13–17.7)
IMM GRANULOCYTES # BLD: 0 X10E3/UL (ref 0–0.1)
IMM GRANULOCYTES NFR BLD: 0 %
INTERPRETATION, 910389: NORMAL
LDLC SERPL CALC-MCNC: 109 MG/DL (ref 0–99)
LYMPHOCYTES # BLD AUTO: 1.9 X10E3/UL (ref 0.7–3.1)
LYMPHOCYTES NFR BLD AUTO: 39 %
Lab: NORMAL
MCH RBC QN AUTO: 27.7 PG (ref 26.6–33)
MCHC RBC AUTO-ENTMCNC: 31.5 G/DL (ref 31.5–35.7)
MCV RBC AUTO: 88 FL (ref 79–97)
MICROALBUMIN UR-MCNC: 25.4 UG/ML
MONOCYTES # BLD AUTO: 0.3 X10E3/UL (ref 0.1–0.9)
MONOCYTES NFR BLD AUTO: 6 %
NEUTROPHILS # BLD AUTO: 2.5 X10E3/UL (ref 1.4–7)
NEUTROPHILS NFR BLD AUTO: 50 %
PLATELET # BLD AUTO: 151 X10E3/UL (ref 150–379)
POTASSIUM SERPL-SCNC: 4.4 MMOL/L (ref 3.5–5.2)
PROT SERPL-MCNC: 6.9 G/DL (ref 6–8.5)
RBC # BLD AUTO: 4.87 X10E6/UL (ref 4.14–5.8)
SODIUM SERPL-SCNC: 142 MMOL/L (ref 134–144)
TRIGL SERPL-MCNC: 188 MG/DL (ref 0–149)
TSH SERPL DL<=0.005 MIU/L-ACNC: 0.92 UIU/ML (ref 0.45–4.5)
VIT B12 SERPL-MCNC: 677 PG/ML (ref 232–1245)
VLDLC SERPL CALC-MCNC: 38 MG/DL (ref 5–40)
WBC # BLD AUTO: 4.9 X10E3/UL (ref 3.4–10.8)

## 2018-11-16 RX ORDER — ERGOCALCIFEROL 1.25 MG/1
50000 CAPSULE ORAL
Qty: 8 CAP | Refills: 3 | Status: SHIPPED | OUTPATIENT
Start: 2018-11-16 | End: 2018-12-11

## 2018-11-16 NOTE — PROGRESS NOTES
Hey there, your vit D is severely low, critically low which is causing the fatigue and memory loss. I am going to send in a prescription of vitamin D to take twice a WEEK and recheck in 4 weeks. Otherwise really work on the diet for a sugar that is way too high.  Yazan Davis

## 2018-11-21 ENCOUNTER — HOSPITAL ENCOUNTER (OUTPATIENT)
Dept: CT IMAGING | Age: 58
Discharge: HOME | End: 2018-11-21
Attending: NURSE PRACTITIONER
Payer: COMMERCIAL

## 2018-11-21 DIAGNOSIS — R41.3 MEMORY LOSS: ICD-10-CM

## 2018-11-21 PROCEDURE — 70450 CT HEAD/BRAIN W/O DYE: CPT

## 2018-11-21 NOTE — PROGRESS NOTES
Hey there, your CT of the head is perfectly normal. The memory loss could be the low vit D, lets keep an eye on it and recheck lab in 6 weeks.  Marni Kapadia

## 2018-11-23 NOTE — PROGRESS NOTES
Subjective: Brendan Etienne is a 62 y.o. male presenting for his annual checkup. ROS:  Feeling well. No dyspnea or chest pain on exertion. No abdominal pain, change in bowel habits, black or bloody stools. No urinary tract or prostatic symptoms. No neurological complaints. Specific concerns today: memory is terrible, not remembering conversations with wife, plans with friends from [place], concerned for early dementia. Patient Active Problem List    Diagnosis Date Noted    Type [facility]ropathy ([facility] 08/07/2018    HTN (hypertension) 11/18/2014    Anxiety 09/03/2014    Diabetes mellitus type 2, controlled ([facility] 03/13/2012    Hypercholesterolemia 02/15/2011    RA (rheumatoid arthritis) ([facility] 05/25/2010    ED (erectile dysfunction) 05/25/2010     Current Outpatient Medications   Medication Sig Dispense Refill    glimepiride (AMARYL) 2 mg tablet TAKE 1 TABLET BY MOUTH ONCE DAILY BEFORE  DINNER 30 Tab 2    mometasone (ELOCON) 0.1 % topical cream Apply  to affected area two (2) times daily as needed for Skin Irritation. 45 g 1    metFORMIN ER (GLUCOPHAGE XR) 500 mg tablet TAKE TWO TABLETS BY MOUTH TWICE DAILY BEFORE  BREAKFAST  AND  DINNER 120 Tab 5    atorvastatin (LIPITOR) 20 mg tablet TAKE ONE TABLET BY MOUTH ONCE DAILY 30 Tab 5    lisinopril (PRINIVIL, ZESTRIL) 20 mg tablet TAKE ONE TABLET BY MOUTH ONCE DAILY 30 Tab 5    meclizine (ANTIVERT) 25 mg tablet Take 1 Tab by mouth three (3) times daily as needed. For vertigo 60 Tab 1    methocarbamol (ROBAXIN) 750 mg tablet Take 1 Tab by mouth three (3) times daily as needed. 60 Tab 0    ibuprofen (MOTRIN) 800 mg tablet Take 1 Tab by mouth every eight (8) hours as needed for Pain. 60 Tab 1    ENBREL SURECLICK 50 mg/mL (4.84 mL) injection       zolpidem (AMBIEN) 10 mg tablet Take 1 Tab by mouth nightly as needed for Sleep.  30 Tab 0    ergocalciferol (VITAMIN D2) 50,000 unit capsule Take 1 Cap by mouth every Monday and Friday for 8 doses. 8 Cap 3     Family History   Problem Relation Age of Onset    No Known Problems Mother     Arthritis-rheumatoid Father      Social History     Tobacco Use    Smoking status: Former Smoker     Years: 2.00     Types: Cigarettes     Last attempt to quit: 10/10/2009     Years since quittin.1    Smokeless tobacco: Never Used   Substance Use Topics    Alcohol use: Yes     Alcohol/week: 0.0 oz     Comment: occas. Objective:     Visit Vitals  /88   Pulse 72   Temp 98.2 °F (36.8 °C)   Resp 17   Ht 5' 6\" (1.676 m)   Wt 178 lb (80.7 kg)   SpO2 98%   BMI 28.73 kg/m²     The patient appears well, alert, oriented x 3, in no distress. ENT normal.  Neck supple. No adenopathy or thyromegaly. ZANA. Lungs are clear, good air entry, no wheezes, rhonchi or rales. S1 and S2 normal, no murmurs, regular rate and rhythm. Abdomen is soft without tenderness, guarding, mass or organomegaly.  exam: deferred. Extremities show no edema, normal peripheral pulses. Neurological is normal without focal findings. Assessment/Plan:   healthy adult male  lose weight, increase physical activity, follow low fat diet, follow low salt diet, routine labs ordered. Encounter Diagnoses   Name Primary?  Well adult exam Yes    Memory loss     Rheumatoid arthritis involving multiple sites, unspecified rheumatoid factor presence ([facility]     Hypercholesterolemia     Controlled type 2 diabetes mellitus without complication, without long-term current use of insulin (HCC)     Essential hypertension      Orders Placed This Encounter    LIPID PANEL    CBC WITH AUTOMATED DIFF    METABOLIC PANEL, COMPREHENSIVE    TSH 3RD GENERATION    VITAMIN B12    VITAMIN D, 25 HYDROXY    FOLATE    HEMOGLOBIN A1C WITH EAG    MICROALBUMIN, UR, RAND W/ MICROALB/CREAT RATIO    CVD REPORT    DIABETES PATIENT EDUCATION   .   Dev plan with labs  I have discussed the diagnosis with the patient and the intended plan as seen in the above orders. The patient has received an after-visit summary and questions were answered concerning future plans. Patient conveyed understanding of the plan at the time of the visit.     Clarine Bosworth, MSN, ANP  11/23/2018

## 2018-12-12 ENCOUNTER — OFFICE VISIT (OUTPATIENT)
Dept: FAMILY MEDICINE CLINIC | Age: 58
End: 2018-12-12

## 2018-12-12 VITALS
HEART RATE: 67 BPM | RESPIRATION RATE: 16 BRPM | BODY MASS INDEX: 29.11 KG/M2 | TEMPERATURE: 97.9 F | WEIGHT: 181.1 LBS | SYSTOLIC BLOOD PRESSURE: 144 MMHG | HEIGHT: 66 IN | OXYGEN SATURATION: 98 % | DIASTOLIC BLOOD PRESSURE: 83 MMHG

## 2018-12-12 DIAGNOSIS — E55.9 VITAMIN D DEFICIENCY: Primary | ICD-10-CM

## 2018-12-12 DIAGNOSIS — L24.9 IRRITANT CONTACT DERMATITIS, UNSPECIFIED TRIGGER: ICD-10-CM

## 2018-12-12 NOTE — PROGRESS NOTES
Pallavi Olivo is a 62 y.o. male      Chief Complaint   Patient presents with    Foot Pain     Right foot pain.  Arthritis       1. Have you been to the ER, urgent care clinic since your last visit? Hospitalized since your last visit? No    2. Have you seen or consulted any other health care providers outside of the [address] [place] [address] since your last visit? Include any pap smears or colon screening.  No  2

## 2018-12-13 LAB — 25(OH)D3+25(OH)D2 SERPL-MCNC: 39.3 NG/ML (ref 30–100)

## 2018-12-17 NOTE — PROGRESS NOTES
HISTORY OF PRESENT ILLNESS  Jace Estrada is a 62 y.o. male. HPI  He is here today for follow up vit D lab  Critically low and causing memory loss last visit which has improved  No adr/se of meds    He is having skin irritation of the penis  Does have HSV but does not look like his lesions  More like \"scratches\"  Wife does have a lot of course pubic hair and wonders if could be from this    ROS  A comprehensive review of system was obtained and negative except findings in the HPI    Visit Vitals  /83 (BP 1 Location: Right arm, BP Patient Position: Sitting)   Pulse 67   Temp 97.9 °F (36.6 °C) (Oral)   Resp 16   Ht 5' 6\" (1.676 m)   Wt 181 lb 1.6 oz (82.1 kg)   SpO2 98%   BMI 29.23 kg/m²     Physical Exam   Constitutional: He is oriented to person, place, and time. He appears well-developed and well-nourished. No distress. Cardiovascular: Normal rate and regular rhythm. No murmur heard. Pulmonary/Chest: Breath sounds normal. No respiratory distress. He has no wheezes. Musculoskeletal: He exhibits no edema. Neurological: He is alert and oriented to person, place, and time. Skin:   Excoriated skin of the head and shaft of the penis; no lesions or open areas noted   Nursing note and vitals reviewed. ASSESSMENT and PLAN  Encounter Diagnoses   Name Primary?  Vitamin D deficiency Yes    Irritant contact dermatitis, unspecified trigger      Orders Placed This Encounter    VITAMIN D, 25 HYDROXY     Lab updated today  Reviewed triggers of excoriated skin; encouraged a conversation with his wife regarding trimming pubic hair. I have discussed the diagnosis with the patient and the intended plan as seen in the above orders. The patient has received an after-visit summary and questions were answered concerning future plans. Patient conveyed understanding of the plan at the time of the visit.     Anabell Crawford, MSN, ANP  12/16/2018

## 2018-12-18 NOTE — PROGRESS NOTES
Your vitamin D is back in normal range, continue to take the medication once weekly and recheck in 6 months, Adrian Coleman

## 2019-03-20 RX ORDER — GLIMEPIRIDE 2 MG/1
TABLET ORAL
Qty: 30 TAB | Refills: 2 | Status: SHIPPED | OUTPATIENT
Start: 2019-03-20 | End: 2019-11-15 | Stop reason: SDUPTHER

## 2019-05-06 RX ORDER — ATORVASTATIN CALCIUM 20 MG/1
TABLET, FILM COATED ORAL
Qty: 30 TAB | Refills: 5 | Status: SHIPPED | OUTPATIENT
Start: 2019-05-06 | End: 2020-10-15

## 2019-05-06 RX ORDER — LISINOPRIL 20 MG/1
TABLET ORAL
Qty: 30 TAB | Refills: 5 | Status: SHIPPED | OUTPATIENT
Start: 2019-05-06 | End: 2020-10-15

## 2019-07-05 ENCOUNTER — OFFICE VISIT (OUTPATIENT)
Dept: FAMILY MEDICINE CLINIC | Age: 59
End: 2019-07-05

## 2019-07-05 VITALS
OXYGEN SATURATION: 98 % | SYSTOLIC BLOOD PRESSURE: 154 MMHG | BODY MASS INDEX: 27.97 KG/M2 | HEIGHT: 66 IN | HEART RATE: 63 BPM | RESPIRATION RATE: 16 BRPM | DIASTOLIC BLOOD PRESSURE: 80 MMHG | WEIGHT: 174 LBS | TEMPERATURE: 98.2 F

## 2019-07-05 DIAGNOSIS — E11.9 CONTROLLED TYPE 2 DIABETES MELLITUS WITHOUT COMPLICATION, WITHOUT LONG-TERM CURRENT USE OF INSULIN (HCC): ICD-10-CM

## 2019-07-05 DIAGNOSIS — E78.00 HYPERCHOLESTEROLEMIA: ICD-10-CM

## 2019-07-05 DIAGNOSIS — E55.9 VITAMIN D DEFICIENCY: Primary | ICD-10-CM

## 2019-07-05 DIAGNOSIS — I10 ESSENTIAL HYPERTENSION: ICD-10-CM

## 2019-07-05 RX ORDER — METFORMIN HYDROCHLORIDE 500 MG/1
TABLET, EXTENDED RELEASE ORAL
Qty: 360 TAB | Refills: 3 | Status: SHIPPED | OUTPATIENT
Start: 2019-07-05 | End: 2021-02-15

## 2019-07-05 NOTE — PROGRESS NOTES
Chief Complaint   Patient presents with    Follow-up     Pt in office today for fuv  -pt states he has concerns about easy brushing on hes legs (dark marks)  -pt states that he thinks it could be because of his meds  -pt states that when he is at work and hits up against something the marks of left    1. Have you been to the ER, urgent care clinic since your last visit? Hospitalized since your last visit? No    2. Have you seen or consulted any other health care providers outside of the [address] [place] [address] since your last visit? Include any pap smears or colon screening.  No    Pt has no other concerns

## 2019-07-06 LAB
25(OH)D3+25(OH)D2 SERPL-MCNC: 21.8 NG/ML (ref 30–100)
ALBUMIN SERPL-MCNC: 4.5 G/DL (ref 3.5–5.5)
ALBUMIN/CREAT UR: 57.2 MG/G CREAT (ref 0–30)
ALBUMIN/GLOB SERPL: 1.8 {RATIO} (ref 1.2–2.2)
ALP SERPL-CCNC: 89 IU/L (ref 39–117)
ALT SERPL-CCNC: 16 IU/L (ref 0–44)
AST SERPL-CCNC: 13 IU/L (ref 0–40)
BASOPHILS # BLD AUTO: 0.1 X10E3/UL (ref 0–0.2)
BASOPHILS NFR BLD AUTO: 1 %
BILIRUB SERPL-MCNC: 0.3 MG/DL (ref 0–1.2)
BUN SERPL-MCNC: 11 MG/DL (ref 6–24)
BUN/CREAT SERPL: 11 (ref 9–20)
CALCIUM SERPL-MCNC: 9.3 MG/DL (ref 8.7–10.2)
CHLORIDE SERPL-SCNC: 106 MMOL/L (ref 96–106)
CHOLEST SERPL-MCNC: 134 MG/DL (ref 100–199)
CO2 SERPL-SCNC: 24 MMOL/L (ref 20–29)
CREAT SERPL-MCNC: 0.97 MG/DL (ref 0.76–1.27)
CREAT UR-MCNC: 96.8 MG/DL
EOSINOPHIL # BLD AUTO: 0.2 X10E3/UL (ref 0–0.4)
EOSINOPHIL NFR BLD AUTO: 4 %
ERYTHROCYTE [DISTWIDTH] IN BLOOD BY AUTOMATED COUNT: 12.4 % (ref 12.3–15.4)
EST. AVERAGE GLUCOSE BLD GHB EST-MCNC: 186 MG/DL
GLOBULIN SER CALC-MCNC: 2.5 G/DL (ref 1.5–4.5)
GLUCOSE SERPL-MCNC: 124 MG/DL (ref 65–99)
HBA1C MFR BLD: 8.1 % (ref 4.8–5.6)
HCT VFR BLD AUTO: 41.4 % (ref 37.5–51)
HDLC SERPL-MCNC: 35 MG/DL
HGB BLD-MCNC: 13 G/DL (ref 13–17.7)
IMM GRANULOCYTES # BLD AUTO: 0 X10E3/UL (ref 0–0.1)
IMM GRANULOCYTES NFR BLD AUTO: 0 %
INTERPRETATION, 910389: NORMAL
LDLC SERPL CALC-MCNC: 79 MG/DL (ref 0–99)
LYMPHOCYTES # BLD AUTO: 2.3 X10E3/UL (ref 0.7–3.1)
LYMPHOCYTES NFR BLD AUTO: 40 %
Lab: NORMAL
MCH RBC QN AUTO: 28.1 PG (ref 26.6–33)
MCHC RBC AUTO-ENTMCNC: 31.4 G/DL (ref 31.5–35.7)
MCV RBC AUTO: 89 FL (ref 79–97)
MICROALBUMIN UR-MCNC: 55.4 UG/ML
MONOCYTES # BLD AUTO: 0.5 X10E3/UL (ref 0.1–0.9)
MONOCYTES NFR BLD AUTO: 8 %
NEUTROPHILS # BLD AUTO: 2.7 X10E3/UL (ref 1.4–7)
NEUTROPHILS NFR BLD AUTO: 47 %
PLATELET # BLD AUTO: 163 X10E3/UL (ref 150–450)
POTASSIUM SERPL-SCNC: 4.2 MMOL/L (ref 3.5–5.2)
PROT SERPL-MCNC: 7 G/DL (ref 6–8.5)
RBC # BLD AUTO: 4.63 X10E6/UL (ref 4.14–5.8)
SODIUM SERPL-SCNC: 142 MMOL/L (ref 134–144)
TRIGL SERPL-MCNC: 100 MG/DL (ref 0–149)
VLDLC SERPL CALC-MCNC: 20 MG/DL (ref 5–40)
WBC # BLD AUTO: 5.8 X10E3/UL (ref 3.4–10.8)

## 2019-07-08 NOTE — PROGRESS NOTES
Hey there, your labs are climbing slightly for sugar. Make sure you watch the diet for carbs and sweets. Your vitamin D is also slightly low. Do you have any prescription vit D on hand to take weekly?  Princess Fernandez

## 2019-07-08 NOTE — PROGRESS NOTES
HISTORY OF PRESENT ILLNESS  Matty Johnson is a 61 y.o. male. HPI  Cardiovascular Review:  The patient has hypertension and hyperlipidemia. Diet and Lifestyle: generally follows a low fat low cholesterol diet, generally follows a low sodium diet, exercises sporadically, nonsmoker  Home BP Monitoring: is not measured at home. Pertinent ROS: taking medications as instructed, no medication side effects noted, no TIA's, no chest pain on exertion, no dyspnea on exertion, no swelling of ankles. Diabetes Mellitus:  He has diabetes mellitus, and  hyperlipidemia. Diabetic ROS - medication compliance: compliant all of the time, diabetic diet compliance: compliant most of the time, home glucose monitoring: is not performed. Lab review: orders written for new lab studies as appropriate; see orders. Vit D def:  Due for lab recheck, not taking currently    Patient Active Problem List    Diagnosis Date Noted    Vitamin D deficiency 12/12/2018    Type 2 diabetes with nephropathy ([facility] 08/07/2018    HTN (hypertension) 11/18/2014    Anxiety 09/03/2014    Diabetes mellitus type 2, controlled ([facility] 03/13/2012    Hypercholesterolemia 02/15/2011    RA (rheumatoid arthritis) ([facility] 05/25/2010    ED (erectile dysfunction) 05/25/2010     Current Outpatient Medications   Medication Sig Dispense Refill    metFORMIN ER (GLUCOPHAGE XR) 500 mg tablet TAKE TWO TABLETS BY MOUTH TWICE DAILY BEFORE  BREAKFAST  AND  DINNER 360 Tab 3    lisinopril (PRINIVIL, ZESTRIL) 20 mg tablet TAKE 1 TABLET BY MOUTH ONCE DAILY 30 Tab 5    atorvastatin (LIPITOR) 20 mg tablet TAKE 1 TABLET BY MOUTH ONCE DAILY 30 Tab 5    glimepiride (AMARYL) 2 mg tablet TAKE 1 TABLET BY MOUTH ONCE DAILY BEFORE  DINNER 30 Tab 2    mometasone (ELOCON) 0.1 % topical cream Apply  to affected area two (2) times daily as needed for Skin Irritation. 45 g 1    meclizine (ANTIVERT) 25 mg tablet Take 1 Tab by mouth three (3) times daily as needed.  For vertigo 60 Tab 1    methocarbamol (ROBAXIN) 750 mg tablet Take 1 Tab by mouth three (3) times daily as needed. 60 Tab 0    ibuprofen (MOTRIN) 800 mg tablet Take 1 Tab by mouth every eight (8) hours as needed for Pain. 60 Tab 1    ENBREL SURECLICK 50 mg/mL (7.12 mL) injection       zolpidem (AMBIEN) 10 mg tablet Take 1 Tab by mouth nightly as needed for Sleep. 27 Tab 0     Family History   Problem Relation Age of Onset    No Known Problems Mother     Arthritis-rheumatoid Father      Social History     Tobacco Use    Smoking status: Former Smoker     Years: 2.00     Types: Cigarettes     Last attempt to quit: 10/10/2009     Years since quittin.7    Smokeless tobacco: Never Used   Substance Use Topics    Alcohol use: Yes     Alcohol/week: 0.0 oz     Comment: occas. ROS  A comprehensive review of system was obtained and negative except findings in the HPI    Visit Vitals  /80 (BP 1 Location: Left arm, BP Patient Position: Sitting)   Pulse 63   Temp 98.2 °F (36.8 °C) (Oral)   Resp 16   Ht 5' 6\" (1.676 m)   Wt 174 lb (78.9 kg)   SpO2 98%   BMI 28.08 kg/m²     Physical Exam   Constitutional: He is oriented to person, place, and time. He appears well-developed and well-nourished. No distress. Cardiovascular: Regular rhythm. No murmur heard. Pulmonary/Chest: Breath sounds normal. No respiratory distress. He has no wheezes. Musculoskeletal: He exhibits no edema. Neurological: He is alert and oriented to person, place, and time. Nursing note and vitals reviewed. ASSESSMENT and PLAN  Encounter Diagnoses   Name Primary?     Vitamin D deficiency Yes    Hypercholesterolemia     Controlled type 2 diabetes mellitus without complication, without long-term current use of insulin (HCC)     Essential hypertension      Orders Placed This Encounter    LIPID PANEL    CBC WITH AUTOMATED DIFF    METABOLIC PANEL, COMPREHENSIVE    HEMOGLOBIN A1C WITH EAG    MICROALBUMIN, UR, RAND W/ MICROALB/CREAT RATIO  VITAMIN D, 25 HYDROXY    CVD REPORT    DIABETES PATIENT EDUCATION    metFORMIN ER (GLUCOPHAGE XR) 500 mg tablet     Labs and refills updated  Dev plan with results    I have discussed the diagnosis with the patient and the intended plan as seen in the above orders. The patient has received an after-visit summary and questions were answered concerning future plans. Patient conveyed understanding of the plan at the time of the visit.     Cheryl Baca, MSN, ANP  7/8/2019

## 2019-11-15 ENCOUNTER — HOSPITAL ENCOUNTER (OUTPATIENT)
Dept: LAB | Age: 59
Discharge: HOME | End: 2019-11-15

## 2019-11-15 ENCOUNTER — OFFICE VISIT (OUTPATIENT)
Dept: FAMILY MEDICINE CLINIC | Age: 59
End: 2019-11-15

## 2019-11-15 VITALS
TEMPERATURE: 98.7 F | SYSTOLIC BLOOD PRESSURE: 149 MMHG | RESPIRATION RATE: 16 BRPM | WEIGHT: 182.6 LBS | OXYGEN SATURATION: 96 % | DIASTOLIC BLOOD PRESSURE: 80 MMHG | HEART RATE: 70 BPM | HEIGHT: 66 IN | BODY MASS INDEX: 29.35 KG/M2

## 2019-11-15 DIAGNOSIS — I10 ESSENTIAL HYPERTENSION: ICD-10-CM

## 2019-11-15 DIAGNOSIS — E55.9 VITAMIN D DEFICIENCY: ICD-10-CM

## 2019-11-15 DIAGNOSIS — Z00.00 WELL ADULT EXAM: Primary | ICD-10-CM

## 2019-11-15 DIAGNOSIS — E11.9 CONTROLLED TYPE 2 DIABETES MELLITUS WITHOUT COMPLICATION, WITHOUT LONG-TERM CURRENT USE OF INSULIN (HCC): ICD-10-CM

## 2019-11-15 DIAGNOSIS — Z12.5 SCREENING PSA (PROSTATE SPECIFIC ANTIGEN): ICD-10-CM

## 2019-11-15 DIAGNOSIS — E78.00 HYPERCHOLESTEROLEMIA: ICD-10-CM

## 2019-11-15 DIAGNOSIS — Z00.00 WELL ADULT EXAM: ICD-10-CM

## 2019-11-15 LAB
25(OH)D3 SERPL-MCNC: 11.9 NG/ML (ref 30–100)
ALBUMIN SERPL-MCNC: 3.8 G/DL (ref 3.5–5)
ALBUMIN/GLOB SERPL: 1.1 {RATIO} (ref 1.1–2.2)
ALP SERPL-CCNC: 96 U/L (ref 45–117)
ALT SERPL-CCNC: 88 U/L (ref 12–78)
ANION GAP SERPL CALC-SCNC: 4 MMOL/L (ref 5–15)
AST SERPL-CCNC: 37 U/L (ref 15–37)
BASOPHILS # BLD: 0.1 K/UL (ref 0–0.1)
BASOPHILS NFR BLD: 1 % (ref 0–1)
BILIRUB SERPL-MCNC: 0.5 MG/DL (ref 0.2–1)
BUN SERPL-MCNC: 12 MG/DL (ref 6–20)
BUN/CREAT SERPL: 11 (ref 12–20)
CALCIUM SERPL-MCNC: 8.6 MG/DL (ref 8.5–10.1)
CHLORIDE SERPL-SCNC: 108 MMOL/L (ref 97–108)
CHOLEST SERPL-MCNC: 165 MG/DL
CO2 SERPL-SCNC: 30 MMOL/L (ref 21–32)
CREAT SERPL-MCNC: 1.07 MG/DL (ref 0.7–1.3)
CREAT UR-MCNC: 110 MG/DL
DIFFERENTIAL METHOD BLD: NORMAL
EOSINOPHIL # BLD: 0.1 K/UL (ref 0–0.4)
EOSINOPHIL NFR BLD: 2 % (ref 0–7)
ERYTHROCYTE [DISTWIDTH] IN BLOOD BY AUTOMATED COUNT: 12.6 % (ref 11.5–14.5)
EST. AVERAGE GLUCOSE BLD GHB EST-MCNC: 209 MG/DL
GLOBULIN SER CALC-MCNC: 3.4 G/DL (ref 2–4)
GLUCOSE SERPL-MCNC: 223 MG/DL (ref 65–100)
HBA1C MFR BLD: 8.9 % (ref 4–5.6)
HCT VFR BLD AUTO: 42 % (ref 36.6–50.3)
HDLC SERPL-MCNC: 43 MG/DL
HDLC SERPL: 3.8 {RATIO} (ref 0–5)
HGB BLD-MCNC: 12.9 G/DL (ref 12.1–17)
IMM GRANULOCYTES # BLD AUTO: 0 K/UL (ref 0–0.04)
IMM GRANULOCYTES NFR BLD AUTO: 0 % (ref 0–0.5)
LDLC SERPL CALC-MCNC: 103.6 MG/DL (ref 0–100)
LIPID PROFILE,FLP: ABNORMAL
LYMPHOCYTES # BLD: 1.9 K/UL (ref 0.8–3.5)
LYMPHOCYTES NFR BLD: 30 % (ref 12–49)
MCH RBC QN AUTO: 27.9 PG (ref 26–34)
MCHC RBC AUTO-ENTMCNC: 30.7 G/DL (ref 30–36.5)
MCV RBC AUTO: 90.7 FL (ref 80–99)
MICROALBUMIN UR-MCNC: 3 MG/DL
MICROALBUMIN/CREAT UR-RTO: 27 MG/G (ref 0–30)
MONOCYTES # BLD: 0.5 K/UL (ref 0–1)
MONOCYTES NFR BLD: 9 % (ref 5–13)
NEUTS SEG # BLD: 3.6 K/UL (ref 1.8–8)
NEUTS SEG NFR BLD: 58 % (ref 32–75)
NRBC # BLD: 0 K/UL (ref 0–0.01)
NRBC BLD-RTO: 0 PER 100 WBC
PLATELET # BLD AUTO: 159 K/UL (ref 150–400)
PMV BLD AUTO: 11.7 FL (ref 8.9–12.9)
POTASSIUM SERPL-SCNC: 4.2 MMOL/L (ref 3.5–5.1)
PROT SERPL-MCNC: 7.2 G/DL (ref 6.4–8.2)
PSA SERPL-MCNC: 1.2 NG/ML (ref 0.01–4)
RBC # BLD AUTO: 4.63 M/UL (ref 4.1–5.7)
SODIUM SERPL-SCNC: 142 MMOL/L (ref 136–145)
TRIGL SERPL-MCNC: 92 MG/DL (ref ?–150)
TSH SERPL DL<=0.05 MIU/L-ACNC: 0.93 UIU/ML (ref 0.36–3.74)
VLDLC SERPL CALC-MCNC: 18.4 MG/DL
WBC # BLD AUTO: 6.2 K/UL (ref 4.1–11.1)

## 2019-11-15 RX ORDER — METRONIDAZOLE 500 MG/1
500 TABLET ORAL 2 TIMES DAILY
Qty: 14 TAB | Refills: 0 | Status: SHIPPED | OUTPATIENT
Start: 2019-11-15 | End: 2019-11-22

## 2019-11-15 RX ORDER — MOMETASONE FUROATE 1 MG/G
CREAM TOPICAL
Qty: 45 G | Refills: 1 | Status: SHIPPED | OUTPATIENT
Start: 2019-11-15

## 2019-11-15 RX ORDER — GLIMEPIRIDE 2 MG/1
TABLET ORAL
Qty: 90 TAB | Refills: 3 | Status: SHIPPED | OUTPATIENT
Start: 2019-11-15 | End: 2021-02-15

## 2019-11-15 NOTE — PROGRESS NOTES
Vashti Holguin is a 61 y.o. male , id x 2(name and ). Reviewed record, history, and  medications. Chief Complaint   Patient presents with    Complete Physical    Labs     fasting       Vitals:    11/15/19 0725   BP: 149/80   Pulse: 70   Resp: 16   Temp: 98.7 °F (37.1 °C)   TempSrc: Oral   SpO2: 96%   Weight: 182 lb 9.6 oz (82.8 kg)   Height: 5' 6\" (1.676 m)   PainSc:   0 - No pain       Coordination of Care Questionnaire:   1) Have you been to an emergency room, urgent care, or hospitalized since your last visit?   no       2. Have seen or consulted any other health care provider since your last visit? Seen by Dr Timothy Frank.      3 most recent [place] [address] Screens 11/15/2019   Little interest or pleasure in doing things Not at all   Feeling down, depressed, irritable, or hopeless Not at all   Total Score PHQ 2 0       Patient is accompanied by wife I have received verbal consent from Vashti Holguin to discuss any/all medical information while they are present in the room.

## 2019-11-15 NOTE — PROGRESS NOTES
Subjective: Newton Sim is a 61 y.o. male presenting for his annual checkup. ROS:  Feeling well. No dyspnea or chest pain on exertion. No abdominal pain, change in bowel habits, black or bloody stools. No urinary tract or prostatic symptoms. No neurological complaints. Specific concerns today: he is having broken skin and chaffe of the penis from intercourse and dryness. Saw uro that did not do much. Not follow DM diet and sugar has been high. Had also run out of meds and did not refill until a few weeks ago. Is having sx of high sugar. Patient Active Problem List    Diagnosis Date Noted    Vitamin D deficiency 12/12/2018    Type 2 diabetes with nephropathy ([facility] 08/07/2018    HTN (hypertension) 11/18/2014    Anxiety 09/03/2014    Diabetes mellitus type 2, controlled ([facility] 03/13/2012    Hypercholesterolemia 02/15/2011    RA (rheumatoid arthritis) ([facility] 05/25/2010    ED (erectile dysfunction) 05/25/2010     Current Outpatient Medications   Medication Sig Dispense Refill    glimepiride (AMARYL) 2 mg tablet TAKE 1 TABLET BY MOUTH ONCE DAILY BEFORE  DINNER 90 Tab 3    metroNIDAZOLE (FLAGYL) 500 mg tablet Take 1 Tab by mouth two (2) times a day for 7 days. 14 Tab 0    mometasone (ELOCON) 0.1 % topical cream Apply  to affected area two (2) times daily as needed for Skin Irritation. 45 g 1    metFORMIN ER (GLUCOPHAGE XR) 500 mg tablet TAKE TWO TABLETS BY MOUTH TWICE DAILY BEFORE  BREAKFAST  AND  DINNER 360 Tab 3    lisinopril (PRINIVIL, ZESTRIL) 20 mg tablet TAKE 1 TABLET BY MOUTH ONCE DAILY 30 Tab 5    atorvastatin (LIPITOR) 20 mg tablet TAKE 1 TABLET BY MOUTH ONCE DAILY 30 Tab 5    meclizine (ANTIVERT) 25 mg tablet Take 1 Tab by mouth three (3) times daily as needed. For vertigo 60 Tab 1    ibuprofen (MOTRIN) 800 mg tablet Take 1 Tab by mouth every eight (8) hours as needed for Pain.  60 Tab 1    ENBREL SURECLICK 50 mg/mL (4.76 mL) injection       zolpidem (AMBIEN) 10 mg tablet Take 1 Tab by mouth nightly as needed for Sleep. 27 Tab 0     Family History   Problem Relation Age of Onset    No Known Problems Mother     Arthritis-rheumatoid Father      Social History     Tobacco Use    Smoking status: Former Smoker     Years: 2.00     Types: Cigarettes     Last attempt to quit: 10/10/2009     Years since quitting: 10.1    Smokeless tobacco: Never Used   Substance Use Topics    Alcohol use: Yes     Alcohol/week: 0.0 standard drinks     Comment: occas. Objective:     Visit Vitals  /80 (BP 1 Location: Left arm, BP Patient Position: Sitting)   Pulse 70   Temp 98.7 °F (37.1 °C) (Oral)   Resp 16   Ht 5' 6\" (1.676 m)   Wt 182 lb 9.6 oz (82.8 kg)   SpO2 96%   BMI 29.47 kg/m²     The patient appears well, alert, oriented x 3, in no distress. ENT normal.  Neck supple. No adenopathy or thyromegaly. ZANA. Lungs are clear, good air entry, no wheezes, rhonchi or rales. S1 and S2 normal, no murmurs, regular rate and rhythm. Abdomen is soft without tenderness, guarding, mass or organomegaly.  exam: deferred. Extremities show no edema, normal peripheral pulses. Neurological is normal without focal findings. Assessment/Plan:   healthy adult male  lose weight, increase physical activity, follow low fat diet, follow low salt diet, routine labs ordered. Encounter Diagnoses   Name Primary?     Well adult exam Yes    Vitamin D deficiency     Hypercholesterolemia     Controlled type 2 diabetes mellitus without complication, without long-term current use of insulin (HCC)     Essential hypertension     Screening PSA (prostate specific antigen)      Orders Placed This Encounter    LIPID PANEL    CBC WITH AUTOMATED DIFF    METABOLIC PANEL, COMPREHENSIVE    PROSTATE SPECIFIC AG    TSH 3RD GENERATION    HEMOGLOBIN A1C WITH EAG    MICROALBUMIN, UR, RAND W/ MICROALB/CREAT RATIO    VITAMIN D, 25 HYDROXY    glimepiride (AMARYL) 2 mg tablet    metroNIDAZOLE (FLAGYL) 500 mg tablet    mometasone (ELOCON) 0.1 % topical cream   .  Labs updated today  Follow up prn  I have discussed the diagnosis with the patient and the intended plan as seen in the above orders. The patient has received an after-visit summary and questions were answered concerning future plans. Patient conveyed understanding of the plan at the time of the visit.     Buzzy Lesches, MSN, ANP  11/15/2019

## 2019-11-16 NOTE — PROGRESS NOTES
Hey there, your sugar has gone nidia high and your vitamin D is critically low. Do you have any vit D to take weekly? I want to recheck this again in 3 months to make sure your numbers are coming down and you have really got to get serious about the diet changes.  Anna Sanderson

## 2020-10-15 RX ORDER — ATORVASTATIN CALCIUM 20 MG/1
TABLET, FILM COATED ORAL
Qty: 90 TAB | Refills: 0 | Status: SHIPPED | OUTPATIENT
Start: 2020-10-15 | End: 2021-02-15

## 2020-10-15 RX ORDER — LISINOPRIL 20 MG/1
TABLET ORAL
Qty: 90 TAB | Refills: 0 | Status: SHIPPED | OUTPATIENT
Start: 2020-10-15 | End: 2021-02-15

## 2020-11-19 ENCOUNTER — OFFICE VISIT (OUTPATIENT)
Dept: FAMILY MEDICINE CLINIC | Age: 60
End: 2020-11-19
Payer: COMMERCIAL

## 2020-11-19 VITALS
HEART RATE: 67 BPM | WEIGHT: 171 LBS | HEIGHT: 66 IN | OXYGEN SATURATION: 98 % | BODY MASS INDEX: 27.48 KG/M2 | TEMPERATURE: 98.5 F | SYSTOLIC BLOOD PRESSURE: 154 MMHG | DIASTOLIC BLOOD PRESSURE: 85 MMHG | RESPIRATION RATE: 18 BRPM

## 2020-11-19 DIAGNOSIS — Z12.5 SCREENING PSA (PROSTATE SPECIFIC ANTIGEN): ICD-10-CM

## 2020-11-19 DIAGNOSIS — G57.91 NEUROPATHY OF RIGHT FOOT: ICD-10-CM

## 2020-11-19 DIAGNOSIS — E55.9 VITAMIN D DEFICIENCY: ICD-10-CM

## 2020-11-19 DIAGNOSIS — Z23 ENCOUNTER FOR IMMUNIZATION: ICD-10-CM

## 2020-11-19 DIAGNOSIS — Z00.00 WELL ADULT EXAM: Primary | ICD-10-CM

## 2020-11-19 DIAGNOSIS — I10 ESSENTIAL HYPERTENSION: ICD-10-CM

## 2020-11-19 DIAGNOSIS — M06.9 RHEUMATOID ARTHRITIS INVOLVING MULTIPLE SITES, UNSPECIFIED WHETHER RHEUMATOID FACTOR PRESENT (HCC): ICD-10-CM

## 2020-11-19 DIAGNOSIS — E11.21 TYPE 2 DIABETES WITH NEPHROPATHY (HCC): ICD-10-CM

## 2020-11-19 DIAGNOSIS — E11.9 CONTROLLED TYPE 2 DIABETES MELLITUS WITHOUT COMPLICATION, WITHOUT LONG-TERM CURRENT USE OF INSULIN (HCC): ICD-10-CM

## 2020-11-19 DIAGNOSIS — E78.00 HYPERCHOLESTEROLEMIA: ICD-10-CM

## 2020-11-19 PROCEDURE — 90670 PCV13 VACCINE IM: CPT | Performed by: NURSE PRACTITIONER

## 2020-11-19 PROCEDURE — 99396 PREV VISIT EST AGE 40-64: CPT | Performed by: NURSE PRACTITIONER

## 2020-11-19 PROCEDURE — 90471 IMMUNIZATION ADMIN: CPT | Performed by: NURSE PRACTITIONER

## 2020-11-19 RX ORDER — FLASH GLUCOSE SCANNING READER
EACH MISCELLANEOUS
Qty: 1 EACH | Refills: 1 | Status: SHIPPED | OUTPATIENT
Start: 2020-11-19

## 2020-11-19 RX ORDER — GABAPENTIN 100 MG/1
100 CAPSULE ORAL 2 TIMES DAILY
Qty: 60 CAP | Refills: 2 | Status: SHIPPED | OUTPATIENT
Start: 2020-11-19

## 2020-11-19 RX ORDER — FLASH GLUCOSE SENSOR
KIT MISCELLANEOUS
Qty: 2 KIT | Refills: 11 | Status: SHIPPED | OUTPATIENT
Start: 2020-11-19

## 2020-11-19 NOTE — PROGRESS NOTES
Chief Complaint   Patient presents with    Follow-up     Pt in office today for fuv  Pt would like to discuss foot pain   -pt states this has been going on for 1mo    1. Have you been to the ER, urgent care clinic since your last visit? Hospitalized since your last visit? No    2. Have you seen or consulted any other health care providers outside of the [address] [place] [address] since your last visit? Include any pap smears or colon screening. No   Chief Complaint   Patient presents with    Follow-up      Visit Vitals  BP (!) 154/85 (BP 1 Location: Left arm, BP Patient Position: Sitting)   Pulse 67   Temp 98.5 °F (36.9 °C) (Oral)   Resp 18   Ht 5' 6\" (1.676 m)   Wt 171 lb (77.6 kg)   SpO2 98%   BMI 27.60 kg/m²       After obtaining Joey Santana consent, and per orders of akira, injection of prevnar 13 given by [employer] in (R) delt. Patient instructed to remain in clinic for 20 minutes afterwards, and to report any adverse reaction to me immediately. Patient did not display any adverse side effects. Pt / caregiver given opportunity to review vaccine information sheet prior to vaccine administration. Opportunity given for questions and concerns. No questions or concerns at this time.       Pt has no other concerns

## 2020-11-20 LAB
25(OH)D3 SERPL-MCNC: <9 NG/ML (ref 30–100)
ALBUMIN SERPL-MCNC: 4 G/DL (ref 3.5–5)
ALBUMIN/GLOB SERPL: 1.1 {RATIO} (ref 1.1–2.2)
ALP SERPL-CCNC: 91 U/L (ref 45–117)
ALT SERPL-CCNC: 36 U/L (ref 12–78)
ANION GAP SERPL CALC-SCNC: 5 MMOL/L (ref 5–15)
AST SERPL-CCNC: 22 U/L (ref 15–37)
BASOPHILS # BLD: 0.1 K/UL (ref 0–0.1)
BASOPHILS NFR BLD: 1 % (ref 0–1)
BILIRUB SERPL-MCNC: 0.3 MG/DL (ref 0.2–1)
BUN SERPL-MCNC: 14 MG/DL (ref 6–20)
BUN/CREAT SERPL: 12 (ref 12–20)
CALCIUM SERPL-MCNC: 8.9 MG/DL (ref 8.5–10.1)
CHLORIDE SERPL-SCNC: 109 MMOL/L (ref 97–108)
CHOLEST SERPL-MCNC: 153 MG/DL
CO2 SERPL-SCNC: 28 MMOL/L (ref 21–32)
CREAT SERPL-MCNC: 1.17 MG/DL (ref 0.7–1.3)
CREAT UR-MCNC: 97.8 MG/DL
DIFFERENTIAL METHOD BLD: NORMAL
EOSINOPHIL # BLD: 0.1 K/UL (ref 0–0.4)
EOSINOPHIL NFR BLD: 3 % (ref 0–7)
ERYTHROCYTE [DISTWIDTH] IN BLOOD BY AUTOMATED COUNT: 14.2 % (ref 11.5–14.5)
EST. AVERAGE GLUCOSE BLD GHB EST-MCNC: 157 MG/DL
GLOBULIN SER CALC-MCNC: 3.6 G/DL (ref 2–4)
GLUCOSE SERPL-MCNC: 102 MG/DL (ref 65–100)
HBA1C MFR BLD: 7.1 % (ref 4–5.6)
HCT VFR BLD AUTO: 40.6 % (ref 36.6–50.3)
HDLC SERPL-MCNC: 47 MG/DL
HDLC SERPL: 3.3 {RATIO} (ref 0–5)
HGB BLD-MCNC: 12.7 G/DL (ref 12.1–17)
IMM GRANULOCYTES # BLD AUTO: 0 K/UL (ref 0–0.04)
IMM GRANULOCYTES NFR BLD AUTO: 0 % (ref 0–0.5)
LDLC SERPL CALC-MCNC: 84.8 MG/DL (ref 0–100)
LIPID PROFILE,FLP: NORMAL
LYMPHOCYTES # BLD: 2.3 K/UL (ref 0.8–3.5)
LYMPHOCYTES NFR BLD: 46 % (ref 12–49)
MCH RBC QN AUTO: 28.8 PG (ref 26–34)
MCHC RBC AUTO-ENTMCNC: 31.3 G/DL (ref 30–36.5)
MCV RBC AUTO: 92.1 FL (ref 80–99)
MICROALBUMIN UR-MCNC: 2.42 MG/DL
MICROALBUMIN/CREAT UR-RTO: 25 MG/G (ref 0–30)
MONOCYTES # BLD: 0.4 K/UL (ref 0–1)
MONOCYTES NFR BLD: 8 % (ref 5–13)
NEUTS SEG # BLD: 2 K/UL (ref 1.8–8)
NEUTS SEG NFR BLD: 42 % (ref 32–75)
NRBC # BLD: 0 K/UL (ref 0–0.01)
NRBC BLD-RTO: 0 PER 100 WBC
PLATELET # BLD AUTO: 170 K/UL (ref 150–400)
PMV BLD AUTO: 11.7 FL (ref 8.9–12.9)
POTASSIUM SERPL-SCNC: 4.2 MMOL/L (ref 3.5–5.1)
PROT SERPL-MCNC: 7.6 G/DL (ref 6.4–8.2)
PSA SERPL-MCNC: 1.2 NG/ML (ref 0.01–4)
RBC # BLD AUTO: 4.41 M/UL (ref 4.1–5.7)
SODIUM SERPL-SCNC: 142 MMOL/L (ref 136–145)
TRIGL SERPL-MCNC: 106 MG/DL (ref ?–150)
TSH SERPL DL<=0.05 MIU/L-ACNC: 1.01 UIU/ML (ref 0.36–3.74)
VLDLC SERPL CALC-MCNC: 21.2 MG/DL
WBC # BLD AUTO: 4.9 K/UL (ref 4.1–11.1)

## 2020-11-20 NOTE — PROGRESS NOTES
Subjective: Uvaldo Hand is a 61 y.o. male presenting for his annual checkup. ROS:  Feeling well. No dyspnea or chest pain on exertion. No abdominal pain, change in bowel habits, black or bloody stools. No urinary tract or prostatic symptoms. No neurological complaints. Specific concerns today: sugar is running high, admits that it is likely his diet, avg 140-150 avg. Patient Active Problem List    Diagnosis Date Noted    Vitamin D deficiency 12/12/2018    Type 2 diabetes with nephropathy ([facility] 08/07/2018    HTN (hypertension) 11/18/2014    Anxiety 09/03/2014    Diabetes mellitus type 2, controlled ([facility] 03/13/2012    Hypercholesterolemia 02/15/2011    RA (rheumatoid arthritis) ([facility] 05/25/2010    ED (erectile dysfunction) 05/25/2010     Current Outpatient Medications   Medication Sig Dispense Refill    gabapentin (NEURONTIN) 100 mg capsule Take 1 Cap by mouth two (2) times a day. Max Daily Amount: 200 mg. 60 Cap 2    flash glucose scanning reader (ResearchGateStyle Eliud 14 Day [place]) [facility] To be used with sensor for blood sugar monitoring - dx E11.9 1 Each 1    flash glucose sensor (FreeStyle Eliud 14 Day Sensor) kit Change sensor as directed every 2 weeks 2 Kit 11    linaGLIPtin (TRADJENTA) 5 mg tablet Take 1 Tab by mouth daily. 30 Tab 5    atorvastatin (LIPITOR) 20 mg tablet Take 1 tablet by mouth once daily 90 Tab 0    lisinopriL (PRINIVIL, ZESTRIL) 20 mg tablet Take 1 tablet by mouth once daily 90 Tab 0    glimepiride (AMARYL) 2 mg tablet TAKE 1 TABLET BY MOUTH ONCE DAILY BEFORE  DINNER 90 Tab 3    mometasone (ELOCON) 0.1 % topical cream Apply  to affected area two (2) times daily as needed for Skin Irritation. 45 g 1    metFORMIN ER (GLUCOPHAGE XR) 500 mg tablet TAKE TWO TABLETS BY MOUTH TWICE DAILY BEFORE  BREAKFAST  AND  DINNER 360 Tab 3    meclizine (ANTIVERT) 25 mg tablet Take 1 Tab by mouth three (3) times daily as needed.  For vertigo 60 Tab 1    ibuprofen (MOTRIN) 800 mg tablet Take 1 Tab by mouth every eight (8) hours as needed for Pain. 60 Tab 1    ENBREL SURECLICK 50 mg/mL (6.43 mL) injection       zolpidem (AMBIEN) 10 mg tablet Take 1 Tab by mouth nightly as needed for Sleep. 27 Tab 0     Family History   Problem Relation Age of Onset    No Known Problems Mother     Arthritis-rheumatoid Father      Social History     Tobacco Use    Smoking status: Former Smoker     Years: 2.00     Types: Cigarettes     Last attempt to quit: 10/10/2009     Years since quittin.1    Smokeless tobacco: Never Used   Substance Use Topics    Alcohol use: Yes     Alcohol/week: 0.0 standard drinks     Comment: occas. Objective:     Visit Vitals  BP (!) 154/85 (BP 1 Location: Left arm, BP Patient Position: Sitting)   Pulse 67   Temp 98.5 °F (36.9 °C) (Oral)   Resp 18   Ht 5' 6\" (1.676 m)   Wt 171 lb (77.6 kg)   SpO2 98%   BMI 27.60 kg/m²     The patient appears well, alert, oriented x 3, in no distress. ENT normal.  Neck supple. No adenopathy or thyromegaly. ZANA. Lungs are clear, good air entry, no wheezes, rhonchi or rales. S1 and S2 normal, no murmurs, regular rate and rhythm. Abdomen is soft without tenderness, guarding, mass or organomegaly.  exam: deferred. Extremities show no edema, normal peripheral pulses. Neurological is normal without focal findings. Assessment/Plan:   healthy adult male  lose weight, increase physical activity, follow low fat diet, follow low salt diet, routine labs ordered. Encounter Diagnoses   Name Primary?     Well adult exam Yes    Encounter for immunization     Hypercholesterolemia     Controlled type 2 diabetes mellitus without complication, without long-term current use of insulin (HCC)     Essential hypertension     Screening PSA (prostate specific antigen)     Vitamin D deficiency     Rheumatoid arthritis involving multiple sites, unspecified whether rheumatoid factor present ([address]     Type 2 diabetes with nephropathy ([address]  Neuropathy of right foot      Orders Placed This Encounter    Pneumococcal conjugate 13 valent, IM (PREVNAR-13)    LIPID PANEL    CBC WITH AUTOMATED DIFF    METABOLIC PANEL, COMPREHENSIVE    PSA SCREENING (SCREENING)    TSH 3RD GENERATION    HEMOGLOBIN A1C WITH EAG    MICROALBUMIN, UR, RAND W/ MICROALB/CREAT RATIO    VITAMIN D, 25 HYDROXY    gabapentin (NEURONTIN) 100 mg capsule    flash glucose scanning reader (FreeStyle Eliud 14 Day [place]) misc    flash glucose sensor (FreeStyle Eliud 14 Day Sensor) kit    linaGLIPtin (TRADJENTA) 5 mg tablet   . Start [employer] to help get the sugar down  Labs updated  Given freestyle eliud meter and supplies  Also start gabapentin for neuropathy of the right foot    I have discussed the diagnosis with the patient and the intended plan as seen in the above orders. The patient has received an after-visit summary and questions were answered concerning future plans. Patient conveyed understanding of the plan at the time of the visit.     Vadim Ortega, MSN, ANP  11/19/2020

## 2020-11-23 NOTE — PROGRESS NOTES
Hey there, your vitamin D is critically low!! Do you have any vitamin D to take by prescription? I want you to take this twice a week for 6 weeks then recheck. Your sugar looks much improved.  Esteban Jeter

## 2020-11-30 RX ORDER — ERGOCALCIFEROL 1.25 MG/1
50000 CAPSULE ORAL
Qty: 12 CAP | Refills: 3 | Status: SHIPPED | OUTPATIENT
Start: 2020-11-30

## 2020-11-30 NOTE — PROGRESS NOTES
Spoke with pt id x3 informed of labs. Pt states he doesn't have the vit d Rx and would like it sent to the pharmacy.  Pt verbalized understanding

## 2021-02-15 RX ORDER — ATORVASTATIN CALCIUM 20 MG/1
TABLET, FILM COATED ORAL
Qty: 90 TAB | Refills: 0 | Status: SHIPPED | OUTPATIENT
Start: 2021-02-15 | End: 2021-08-23

## 2021-02-15 RX ORDER — LISINOPRIL 20 MG/1
TABLET ORAL
Qty: 90 TAB | Refills: 0 | Status: SHIPPED | OUTPATIENT
Start: 2021-02-15 | End: 2021-08-23

## 2021-02-15 RX ORDER — METFORMIN HYDROCHLORIDE 500 MG/1
TABLET, EXTENDED RELEASE ORAL
Qty: 360 TAB | Refills: 0 | Status: SHIPPED | OUTPATIENT
Start: 2021-02-15 | End: 2021-12-16 | Stop reason: SDUPTHER

## 2021-02-15 RX ORDER — GLIMEPIRIDE 2 MG/1
TABLET ORAL
Qty: 90 TAB | Refills: 0 | Status: SHIPPED | OUTPATIENT
Start: 2021-02-15 | End: 2021-08-08

## 2021-08-08 RX ORDER — GLIMEPIRIDE 2 MG/1
TABLET ORAL
Qty: 90 TABLET | Refills: 0 | Status: SHIPPED | OUTPATIENT
Start: 2021-08-08

## 2021-08-23 RX ORDER — LISINOPRIL 20 MG/1
TABLET ORAL
Qty: 90 TABLET | Refills: 0 | Status: SHIPPED | OUTPATIENT
Start: 2021-08-23 | End: 2022-04-17

## 2021-08-23 RX ORDER — ATORVASTATIN CALCIUM 20 MG/1
TABLET, FILM COATED ORAL
Qty: 90 TABLET | Refills: 0 | Status: SHIPPED | OUTPATIENT
Start: 2021-08-23 | End: 2022-04-17

## 2021-09-21 ENCOUNTER — OFFICE VISIT (OUTPATIENT)
Dept: FAMILY MEDICINE CLINIC | Age: 61
End: 2021-09-21

## 2021-09-21 VITALS
HEART RATE: 79 BPM | WEIGHT: 174 LBS | OXYGEN SATURATION: 97 % | BODY MASS INDEX: 27.97 KG/M2 | SYSTOLIC BLOOD PRESSURE: 148 MMHG | RESPIRATION RATE: 18 BRPM | HEIGHT: 66 IN | TEMPERATURE: 98.3 F | DIASTOLIC BLOOD PRESSURE: 80 MMHG

## 2021-09-21 DIAGNOSIS — B35.4 TINEA CORPORIS: Primary | ICD-10-CM

## 2021-09-21 PROCEDURE — 99213 OFFICE O/P EST LOW 20 MIN: CPT | Performed by: NURSE PRACTITIONER

## 2021-09-21 RX ORDER — VALACYCLOVIR HYDROCHLORIDE 500 MG/1
500 TABLET, FILM COATED ORAL 3 TIMES DAILY
Qty: 21 TABLET | Refills: 3 | Status: SHIPPED | OUTPATIENT
Start: 2021-09-21 | End: 2021-09-28

## 2021-09-21 NOTE — PROGRESS NOTES
HISTORY OF PRESENT ILLNESS  Yaya Tolbert is a 64 y.o. male. HPI  Pt being seen for painful intercourse  Pt states this has been going on for at least 1 year  -pt states he has seen derm and urologist  -pt states they said it was a skin issue and gave him a cream  Did not help and does not remember the name of it  Actively also has herpes outbreak of genitals    ROS  A comprehensive review of system was obtained and negative except findings in the HPI    Visit Vitals  BP (!) 148/80 (BP 1 Location: Left upper arm, BP Patient Position: Sitting)   Pulse 79   Temp 98.3 °F (36.8 °C) (Oral)   Resp 18   Ht 5' 6\" (1.676 m)   Wt 174 lb (78.9 kg)   SpO2 97%   BMI 28.08 kg/m²     Physical Exam  Vitals and nursing note reviewed. Constitutional:       Appearance: Normal appearance. Neurological:      General: No focal deficit present. Mental Status: He is oriented to person, place, and time. ASSESSMENT and PLAN  Encounter Diagnoses   Name Primary?  Tinea corporis  HSV 2 Yes     Orders Placed This Encounter    valACYclovir (VALTREX) 500 mg tablet     Given valtrex   Will send [employer] message with name of cream so that I can better decide on next steps    I have discussed the diagnosis with the patient and the intended plan as seen in the above orders. The patient has received an after-visit summary and questions were answered concerning future plans. Patient conveyed understanding of the plan at the time of the visit.     Skip Watt, MSN, ANP  9/21/2021

## 2021-09-21 NOTE — PROGRESS NOTES
Chief Complaint   Patient presents with    Painful Sex     Pt being seen for painful intercourse  Pt states this has been going on for at least 1 year  -pt states he has seen derm and urologist  -pt states they said it was a skin issue and gave him a cream    1. Have you been to the ER, urgent care clinic since your last visit? Hospitalized since your last visit? No    2. Have you seen or consulted any other health care providers outside of the [address] [place] [address] since your last visit? Include any pap smears or colon screening.  No     Pt has no other concerns

## 2021-09-22 RX ORDER — NYSTATIN 100000 U/G
CREAM TOPICAL
Qty: 60 G | Refills: 1 | Status: SHIPPED | OUTPATIENT
Start: 2021-09-22

## 2021-10-18 ENCOUNTER — OFFICE VISIT (OUTPATIENT)
Dept: FAMILY MEDICINE CLINIC | Age: 61
End: 2021-10-18
Payer: COMMERCIAL

## 2021-10-18 VITALS
SYSTOLIC BLOOD PRESSURE: 135 MMHG | HEART RATE: 66 BPM | BODY MASS INDEX: 27 KG/M2 | TEMPERATURE: 98.3 F | OXYGEN SATURATION: 97 % | RESPIRATION RATE: 18 BRPM | WEIGHT: 168 LBS | HEIGHT: 66 IN | DIASTOLIC BLOOD PRESSURE: 71 MMHG

## 2021-10-18 DIAGNOSIS — M54.50 RIGHT-SIDED LOW BACK PAIN WITHOUT SCIATICA, UNSPECIFIED CHRONICITY: Primary | ICD-10-CM

## 2021-10-18 DIAGNOSIS — L43.8 ORAL LICHEN PLANUS: ICD-10-CM

## 2021-10-18 PROCEDURE — 99214 OFFICE O/P EST MOD 30 MIN: CPT | Performed by: NURSE PRACTITIONER

## 2021-10-18 RX ORDER — METHOCARBAMOL 500 MG/1
500 TABLET, FILM COATED ORAL
Qty: 30 TABLET | Refills: 1 | Status: SHIPPED | OUTPATIENT
Start: 2021-10-18

## 2021-10-18 NOTE — PROGRESS NOTES
HISTORY OF PRESENT ILLNESS  Alexia Leblanc is a 64 y.o. male. HPI  He is here for low back pain on the right  Start 10 days ago, fell at work  Seen in ER, dx with disc bulge  Did not file for [facility] yet, given pain pill in ER only - works at [employer]    He has ulcerations of the inside of the mouth and white web like rash  Has RA, no changes in meds  Is stressed with pain and the fall    ROS  A comprehensive review of system was obtained and negative except findings in the HPI    Visit Vitals  /71 (BP 1 Location: Left upper arm, BP Patient Position: Sitting)   Pulse 66   Temp 98.3 °F (36.8 °C) (Oral)   Resp 18   Ht 5' 6\" (1.676 m)   Wt 168 lb (76.2 kg)   SpO2 97%   BMI 27.12 kg/m²     Physical Exam  Vitals and nursing note reviewed. Constitutional:       Appearance: Normal appearance. Cardiovascular:      Rate and Rhythm: Normal rate. Heart sounds: Normal heart sounds. Skin:     Comments: Inside of mouth with white web appearance of the cheeks and ulceration of the upper inside lip   Neurological:      Mental Status: He is alert. ASSESSMENT and PLAN  Encounter Diagnoses   Name Primary?  Right-sided low back pain without sciatica, unspecified chronicity Yes    Oral lichen planus      Orders Placed This Encounter    methocarbamoL (ROBAXIN) 500 mg tablet     Given robaxin until he can go back to employee health to file [facility] claim  Advised to see Rheum for ?lichen planus dx    I have discussed the diagnosis with the patient and the intended plan as seen in the above orders. The patient has received an after-visit summary and questions were answered concerning future plans. Patient conveyed understanding of the plan at the time of the visit.     Felice Burns, MSN, ANP  10/18/2021

## 2021-10-18 NOTE — PROGRESS NOTES
Chief Complaint   Patient presents with    Back Pain     Pt being seen for back pain  Pt states 2 fridays ago he had a sharp pain in his back and he had a CT scan that showed a budging disk  Pt states this happened while he was at work walking        1. Have you been to the ER, urgent care clinic since your last visit? [facility]    2. Have you seen or consulted any other health care providers outside of the [address] [place] [address] since your last visit? Include any pap smears or colon screening.  No     Pt has no other concerns

## 2021-12-01 ENCOUNTER — TELEPHONE (OUTPATIENT)
Dept: FAMILY MEDICINE CLINIC | Age: 61
End: 2021-12-01

## 2021-12-01 ENCOUNTER — PATIENT MESSAGE (OUTPATIENT)
Dept: FAMILY MEDICINE CLINIC | Age: 61
End: 2021-12-01

## 2021-12-01 NOTE — TELEPHONE ENCOUNTER
----- Message from Asmita Stuart LPN sent at 93/2/1528 11:00 AM EST -----  Regarding: A1C needed      Please have your nurse or psr reach out to this patient and get  patient in for a point of care A1C or lab draw.      Thank you

## 2021-12-01 NOTE — TELEPHONE ENCOUNTER
Please contact him to come in before end of year per insurnace to do his well exam and fasting labs. Needs to be done before end of year.  Isis Uribe

## 2021-12-16 ENCOUNTER — OFFICE VISIT (OUTPATIENT)
Dept: FAMILY MEDICINE CLINIC | Age: 61
End: 2021-12-16
Payer: COMMERCIAL

## 2021-12-16 VITALS
WEIGHT: 173 LBS | RESPIRATION RATE: 16 BRPM | OXYGEN SATURATION: 96 % | SYSTOLIC BLOOD PRESSURE: 102 MMHG | BODY MASS INDEX: 27.8 KG/M2 | HEIGHT: 66 IN | HEART RATE: 74 BPM | DIASTOLIC BLOOD PRESSURE: 65 MMHG | TEMPERATURE: 98.2 F

## 2021-12-16 DIAGNOSIS — M50.30 DDD (DEGENERATIVE DISC DISEASE), CERVICAL: ICD-10-CM

## 2021-12-16 DIAGNOSIS — E78.00 HYPERCHOLESTEROLEMIA: ICD-10-CM

## 2021-12-16 DIAGNOSIS — Z00.00 WELL ADULT EXAM: Primary | ICD-10-CM

## 2021-12-16 DIAGNOSIS — Z12.5 SCREENING PSA (PROSTATE SPECIFIC ANTIGEN): ICD-10-CM

## 2021-12-16 DIAGNOSIS — Z11.59 ENCOUNTER FOR HEPATITIS C SCREENING TEST FOR LOW RISK PATIENT: ICD-10-CM

## 2021-12-16 DIAGNOSIS — E11.9 CONTROLLED TYPE 2 DIABETES MELLITUS WITHOUT COMPLICATION, WITHOUT LONG-TERM CURRENT USE OF INSULIN (HCC): ICD-10-CM

## 2021-12-16 DIAGNOSIS — E55.9 VITAMIN D DEFICIENCY: ICD-10-CM

## 2021-12-16 DIAGNOSIS — M06.9 RHEUMATOID ARTHRITIS INVOLVING MULTIPLE SITES, UNSPECIFIED WHETHER RHEUMATOID FACTOR PRESENT (HCC): ICD-10-CM

## 2021-12-16 DIAGNOSIS — I10 ESSENTIAL HYPERTENSION: ICD-10-CM

## 2021-12-16 PROCEDURE — 99396 PREV VISIT EST AGE 40-64: CPT | Performed by: NURSE PRACTITIONER

## 2021-12-16 RX ORDER — PREDNISONE 10 MG/1
TABLET ORAL
Qty: 21 TABLET | Refills: 0 | Status: SHIPPED | OUTPATIENT
Start: 2021-12-16

## 2021-12-16 RX ORDER — METFORMIN HYDROCHLORIDE 500 MG/1
TABLET, EXTENDED RELEASE ORAL
Qty: 360 TABLET | Refills: 0 | Status: SHIPPED | OUTPATIENT
Start: 2021-12-16

## 2021-12-16 NOTE — PROGRESS NOTES
Chief Complaint   Patient presents with    Annual Wellness Visit    Labs     Pt being seen for wellness visit  -labs  Pt states he has been having pain in his wrist   -pt states this has been going on for 2 days    1. Have you been to the ER, urgent care clinic since your last visit? Hospitalized since your last visit? No    2. Have you seen or consulted any other health care providers outside of the [address] [place] [address] since your last visit? Include any pap smears or colon screening.  No     Pt has no other concerns community/leisure/self-care/home management

## 2021-12-16 NOTE — PROGRESS NOTES
Subjective: Garrett Bassett is a 64 y.o. male presenting for his annual checkup. ROS:  Feeling well. No dyspnea or chest pain on exertion. No abdominal pain, change in bowel habits, black or bloody stools. No urinary tract or prostatic symptoms. No neurological complaints. Specific concerns today: having RA flair and having pain and rashes. Patient Active Problem List    Diagnosis Date Noted    Vitamin D deficiency 12/12/2018    Type 2 diabetes with nephropathy ([facility] 08/07/2018    HTN (hypertension) 11/18/2014    Anxiety 09/03/2014    Diabetes mellitus type 2, controlled ([facility] 03/13/2012    Hypercholesterolemia 02/15/2011    RA (rheumatoid arthritis) ([facility] 05/25/2010    ED (erectile dysfunction) 05/25/2010     Current Outpatient Medications   Medication Sig Dispense Refill    predniSONE (STERAPRED DS) 10 mg dose pack See administration instruction per 10mg dose pack - 6 days 21 Tablet 0    metFORMIN ER (GLUCOPHAGE XR) 500 mg tablet TAKE 2 TABLETS BY MOUTH TWICE DAILY BEFORE BREAKFAST AND BEFORE SUPPER 360 Tablet 0    methocarbamoL (ROBAXIN) 500 mg tablet Take 1 Tablet by mouth three (3) times daily as needed for Muscle Spasm(s). 30 Tablet 1    nystatin (MYCOSTATIN) topical cream Apply  to affected area two (2) times daily as needed for Skin Irritation. 60 g 1    lisinopriL (PRINIVIL, ZESTRIL) 20 mg tablet Take 1 tablet by mouth once daily 90 Tablet 0    atorvastatin (LIPITOR) 20 mg tablet Take 1 tablet by mouth once daily 90 Tablet 0    glimepiride (AMARYL) 2 mg tablet TAKE 1 TABLET BY MOUTH ONCE DAILY BEFORE SUPPER 90 Tablet 0    ergocalciferol (Vitamin D2) 1,250 mcg (50,000 unit) capsule Take 1 Cap by mouth every seven (7) days. 12 Cap 3    gabapentin (NEURONTIN) 100 mg capsule Take 1 Cap by mouth two (2) times a day.  Max Daily Amount: 200 mg. 60 Cap 2    flash glucose scanning reader ([employer] Eliud 14 Day [place]) [facility] To be used with sensor for blood sugar monitoring - dx E11.9 1 Each 1    flash glucose sensor (FreeStyle Eliud 14 Day Sensor) kit Change sensor as directed every 2 weeks 2 Kit 11    linaGLIPtin (TRADJENTA) 5 mg tablet Take 1 Tab by mouth daily. 30 Tab 5    mometasone (ELOCON) 0.1 % topical cream Apply  to affected area two (2) times daily as needed for Skin Irritation. 45 g 1    meclizine (ANTIVERT) 25 mg tablet Take 1 Tab by mouth three (3) times daily as needed. For vertigo 60 Tab 1    ibuprofen (MOTRIN) 800 mg tablet Take 1 Tab by mouth every eight (8) hours as needed for Pain. 60 Tab 1    ENBREL SURECLICK 50 mg/mL (0.01 mL) injection       zolpidem (AMBIEN) 10 mg tablet Take 1 Tab by mouth nightly as needed for Sleep. 27 Tab 0     Family History   Problem Relation Age of Onset    No Known Problems Mother     Arthritis-rheumatoid Father      Social History     Tobacco Use    Smoking status: Former Smoker     Years: 2.00     Types: Cigarettes     Quit date: 10/10/2009     Years since quittin.1    Smokeless tobacco: Never Used   Substance Use Topics    Alcohol use: Yes     Alcohol/week: 0.0 standard drinks     Comment: occas. Objective:     Visit Vitals  /65 (BP 1 Location: Left upper arm, BP Patient Position: Sitting)   Pulse 74   Temp 98.2 °F (36.8 °C) (Oral)   Resp 16   Ht 5' 6\" (1.676 m)   Wt 173 lb (78.5 kg)   SpO2 96%   BMI 27.92 kg/m²     The patient appears well, alert, oriented x 3, in no distress. ENT normal.  Neck supple. No adenopathy or thyromegaly. ZANA. Lungs are clear, good air entry, no wheezes, rhonchi or rales. S1 and S2 normal, no murmurs, regular rate and rhythm. Abdomen is soft without tenderness, guarding, mass or organomegaly.  exam: deferred. Extremities show no edema, normal peripheral pulses. Neurological is normal without focal findings. Assessment/Plan:   healthy adult male  lose weight, increase physical activity, follow low fat diet, follow low salt diet, routine labs ordered.   Encounter Diagnoses Name Primary?  Well adult exam Yes    Hypercholesterolemia     Controlled type 2 diabetes mellitus without complication, without long-term current use of insulin (HCC)     Essential hypertension     Screening PSA (prostate specific antigen)     Vitamin D deficiency     Encounter for hepatitis C screening test for low risk patient     Rheumatoid arthritis involving multiple sites, unspecified whether rheumatoid factor present ([facility]     DDD (degenerative disc disease), cervical      Orders Placed This Encounter    LIPID PANEL    TSH 3RD GENERATION    METABOLIC PANEL, COMPREHENSIVE    CBC WITH AUTOMATED DIFF    HEMOGLOBIN A1C WITH EAG    MICROALBUMIN, UR, RAND W/ MICROALB/CREAT RATIO    PSA SCREENING (SCREENING)    HEPATITIS C AB    VITAMIN D, 25 HYDROXY    [facility]    predniSONE (STERAPRED DS) 10 mg dose pack    metFORMIN ER (GLUCOPHAGE XR) 500 mg tablet   . Labs and refills updated  Given pred pack and reviewed adr/se of med    I have discussed the diagnosis with the patient and the intended plan as seen in the above orders. The patient has received an after-visit summary and questions were answered concerning future plans. Patient conveyed understanding of the plan at the time of the visit.     Aide Stanford, MSN, ANP  12/16/2021

## 2021-12-17 LAB
25(OH)D3+25(OH)D2 SERPL-MCNC: 21.7 NG/ML (ref 30–100)
ALBUMIN SERPL-MCNC: 4.1 G/DL (ref 3.8–4.8)
ALBUMIN/CREAT UR: 26 MG/G CREAT (ref 0–29)
ALBUMIN/GLOB SERPL: 1.6 {RATIO} (ref 1.2–2.2)
ALP SERPL-CCNC: 92 IU/L (ref 44–121)
ALT SERPL-CCNC: 25 IU/L (ref 0–44)
AST SERPL-CCNC: 17 IU/L (ref 0–40)
BASOPHILS # BLD AUTO: 0 X10E3/UL (ref 0–0.2)
BASOPHILS NFR BLD AUTO: 1 %
BILIRUB SERPL-MCNC: 0.3 MG/DL (ref 0–1.2)
BUN SERPL-MCNC: 12 MG/DL (ref 8–27)
BUN/CREAT SERPL: 13 (ref 10–24)
CALCIUM SERPL-MCNC: 9 MG/DL (ref 8.6–10.2)
CHLORIDE SERPL-SCNC: 102 MMOL/L (ref 96–106)
CHOLEST SERPL-MCNC: 184 MG/DL (ref 100–199)
CO2 SERPL-SCNC: 22 MMOL/L (ref 20–29)
CREAT SERPL-MCNC: 0.94 MG/DL (ref 0.76–1.27)
CREAT UR-MCNC: 116.5 MG/DL
EOSINOPHIL # BLD AUTO: 0.1 X10E3/UL (ref 0–0.4)
EOSINOPHIL NFR BLD AUTO: 2 %
ERYTHROCYTE [DISTWIDTH] IN BLOOD BY AUTOMATED COUNT: 12.2 % (ref 11.6–15.4)
EST. AVERAGE GLUCOSE BLD GHB EST-MCNC: 283 MG/DL
GLOBULIN SER CALC-MCNC: 2.5 G/DL (ref 1.5–4.5)
GLUCOSE SERPL-MCNC: 350 MG/DL (ref 65–99)
HBA1C MFR BLD: 11.5 % (ref 4.8–5.6)
HCT VFR BLD AUTO: 42.5 % (ref 37.5–51)
HCV AB S/CO SERPL IA: 0.2 S/CO RATIO (ref 0–0.9)
HDLC SERPL-MCNC: 34 MG/DL
HGB BLD-MCNC: 13.9 G/DL (ref 13–17.7)
IMM GRANULOCYTES # BLD AUTO: 0 X10E3/UL (ref 0–0.1)
IMM GRANULOCYTES NFR BLD AUTO: 0 %
IMP & REVIEW OF LAB RESULTS: NORMAL
LDLC SERPL CALC-MCNC: 112 MG/DL (ref 0–99)
LYMPHOCYTES # BLD AUTO: 1.8 X10E3/UL (ref 0.7–3.1)
LYMPHOCYTES NFR BLD AUTO: 30 %
MCH RBC QN AUTO: 28.7 PG (ref 26.6–33)
MCHC RBC AUTO-ENTMCNC: 32.7 G/DL (ref 31.5–35.7)
MCV RBC AUTO: 88 FL (ref 79–97)
MICROALBUMIN UR-MCNC: 30 UG/ML
MONOCYTES # BLD AUTO: 0.4 X10E3/UL (ref 0.1–0.9)
MONOCYTES NFR BLD AUTO: 7 %
NEUTROPHILS # BLD AUTO: 3.7 X10E3/UL (ref 1.4–7)
NEUTROPHILS NFR BLD AUTO: 60 %
PLATELET # BLD AUTO: 151 X10E3/UL (ref 150–450)
POTASSIUM SERPL-SCNC: 4.4 MMOL/L (ref 3.5–5.2)
PROT SERPL-MCNC: 6.6 G/DL (ref 6–8.5)
PSA SERPL-MCNC: 1.1 NG/ML (ref 0–4)
RBC # BLD AUTO: 4.84 X10E6/UL (ref 4.14–5.8)
SODIUM SERPL-SCNC: 139 MMOL/L (ref 134–144)
TRIGL SERPL-MCNC: 217 MG/DL (ref 0–149)
TSH SERPL DL<=0.005 MIU/L-ACNC: 0.8 UIU/ML (ref 0.45–4.5)
VLDLC SERPL CALC-MCNC: 38 MG/DL (ref 5–40)
WBC # BLD AUTO: 6 X10E3/UL (ref 3.4–10.8)

## 2021-12-20 NOTE — PROGRESS NOTES
Your sugar has gone nidia high and out of control again! ! Are you out of meds or is it what you are eating? The rest of the labs are in good range.  [facility]

## 2022-03-19 PROBLEM — E55.9 VITAMIN D DEFICIENCY: Status: ACTIVE | Noted: 2018-12-12

## 2022-03-19 PROBLEM — E11.21 TYPE 2 DIABETES WITH NEPHROPATHY (HCC): Status: ACTIVE | Noted: 2018-08-07

## 2022-04-17 RX ORDER — ATORVASTATIN CALCIUM 20 MG/1
TABLET, FILM COATED ORAL
Qty: 90 TABLET | Refills: 0 | Status: SHIPPED | OUTPATIENT
Start: 2022-04-17

## 2022-04-17 RX ORDER — LISINOPRIL 20 MG/1
TABLET ORAL
Qty: 90 TABLET | Refills: 0 | Status: SHIPPED | OUTPATIENT
Start: 2022-04-17

## 2022-06-15 NOTE — PROGRESS NOTES
Hey there, your labs look fantastic!! Recheck 3 months fasting.  Arjun Mckeon Progress Note - General Surgery   Cici Gates 46 y o  male MRN: [id number]  Unit/Bed#: MICU 05 Encounter: [id number]    Assessment:  46 M w/ PMH of BMI 73, Afib, DVT, COPD, CHF, large ventral hernia and SBO s/p ex lap HARVEY w/ Abthera placement 6/14  Gtt: cardizem, fent, prop   40%, 8  NGT 500cc  Abthera 650 cc       Plan:  · OR for 2nd look laparotomy, possible closure 6/15  · Monitor vac output  · Appreciate ICU care  · Please tigertext on call red surgery resident role or acute care floor call role with any questions       Subjective/Objective     Subjective:   No acute events overnight  No BM  Pertinent review of systems as above  All other review of systems negative  Objective:    Blood pressure 107/62, pulse 102, temperature 98 96 °F (37 2 °C), resp  rate 18, SpO2 98 %  ,There is no height or weight on file to calculate BMI  Intake/Output Summary (Last 24 hours) at 6/15/2022 0522  Last data filed at 6/15/2022 0401  Gross per 24 hour   Intake 1544 7 ml   Output 1845 ml   Net -300 3 ml       Invasive Devices  Report    Peripherally Inserted Central Catheter Line  Duration           PICC Line 96/43/73 Right Basilic 1 day          Central Venous Catheter Line  Duration           CVC Central Lines 06/14/22 Triple <1 day          Arterial Line  Duration           Arterial Line 06/14/22 Left Axillary <1 day          Drain  Duration           NG/OG/Enteral Tube Right nare 3 days    Urethral Catheter Latex 16 Fr  <1 day          Airway  Duration           ETT  Hi-Lo; Cuffed;Oral 8 mm <1 day                Physical Exam:   Gen:  NAD  HEENT: NCAT   MMM  CV: well perfused  Lungs: Normal respiratory effort  Abd: soft, nt/nd abthera in place  Skin: warm/ dry  Extremities: no peripheral edema, no clubbing or cyanosis  Neuro: intubated/ sedated      Results from last 7 days   Lab Units 06/15/22  0449 06/14/22  1847 06/14/22  1339   WBC Thousand/uL 9 64 9 04 5 18   HEMOGLOBIN g/dL 11 2* 11 4* 11 7* HEMATOCRIT % 34 6* 34 9* 36 2*   PLATELETS Thousands/uL 168 162 168     Results from last 7 days   Lab Units 06/14/22  1847 06/14/22  1339 06/13/22  0636   POTASSIUM mmol/L 4 8 4 9 4 7   CHLORIDE mmol/L 104 102 100   CO2 mmol/L 26 26 31   BUN mg/dL 6 7 6   CREATININE mg/dL 0 62 0 65 0 73   CALCIUM mg/dL 9 0 8 1* 8 8     Results from last 7 days   Lab Units 06/14/22  0700 06/13/22  2111 06/13/22  1329   PTT seconds 22* 87* 42*        I have personally reviewed pertinent films in PACS      Medications:   Scheduled Meds:  Current Facility-Administered Medications   Medication Dose Route Frequency Provider Last Rate    cefazolin  3,000 mg Intravenous On Call To OR Reba More MD      chlorhexidine  15 mL Mouth/Throat Q12H 75 Tyson Mendosa MD      diltiazem  1-15 mg/hr Intravenous Titrated RADHA Franklin Stopped (06/14/22 2148)    fentaNYL  100 mcg/hr Intravenous Continuous Lupe Taylor  mcg/hr (06/15/22 0000)    fentanyl citrate (PF)  50 mcg Intravenous Q1H PRN RADHA Franklin      heparin (porcine)  3-20 Units/kg/hr (Order-Specific) Intravenous Titrated Neeta Valdes MD 11 1 Units/kg/hr (06/15/22 0454)    heparin (porcine)  2,000 Units Intravenous Q1H PRN Neeta Valdes MD      heparin (porcine)  4,000 Units Intravenous Q1H PRN Neeta Valdes MD      insulin lispro  1-5 Units Subcutaneous Q6H [facility] Lupe Taylor MD      ipratropium  0 5 mg Nebulization TID Neeta Valdes MD      levalbuterol  1 25 mg Nebulization TID Neeta Valdes MD      metroNIDAZOLE  500 mg Intravenous On Call To Itzel Wright MD      multi-electrolyte  125 mL/hr Intravenous Continuous Neeta Valdes  mL/hr (06/14/22 2142)    pantoprazole  40 mg Intravenous Q24H [facility] Brendan Kilpatrick MD      propofol  5-50 mcg/kg/min Intravenous Titrated RADHA Brito 25 mcg/kg/min (06/15/22 3184)     Continuous Infusions:diltiazem, 1-15 mg/hr, Last Rate: Stopped (06/14/22 0817)  fentaNYL, 100 mcg/hr, Last Rate: 100 mcg/hr (06/15/22 0000)  heparin (porcine), 3-20 Units/kg/hr (Order-Specific), Last Rate: 11 1 Units/kg/hr (06/15/22 0454)  multi-electrolyte, 125 mL/hr, Last Rate: 125 mL/hr (06/14/22 0652)  propofol, 5-50 mcg/kg/min, Last Rate: 25 mcg/kg/min (06/15/22 4991)      PRN Meds:  fentanyl citrate (PF), 50 mcg, Q1H PRN  heparin (porcine), 2,000 Units, Q1H PRN  heparin (porcine), 4,000 Units, Q1H PRN

## 2022-06-27 ENCOUNTER — OFFICE VISIT (OUTPATIENT)
Dept: FAMILY MEDICINE CLINIC | Age: 62
End: 2022-06-27
Payer: COMMERCIAL

## 2022-06-27 VITALS
OXYGEN SATURATION: 96 % | TEMPERATURE: 98.5 F | BODY MASS INDEX: 26.03 KG/M2 | DIASTOLIC BLOOD PRESSURE: 69 MMHG | RESPIRATION RATE: 16 BRPM | HEART RATE: 63 BPM | HEIGHT: 66 IN | WEIGHT: 162 LBS | SYSTOLIC BLOOD PRESSURE: 130 MMHG

## 2022-06-27 DIAGNOSIS — E78.00 HYPERCHOLESTEROLEMIA: Primary | ICD-10-CM

## 2022-06-27 DIAGNOSIS — I10 ESSENTIAL HYPERTENSION: ICD-10-CM

## 2022-06-27 DIAGNOSIS — E11.9 CONTROLLED TYPE 2 DIABETES MELLITUS WITHOUT COMPLICATION, WITHOUT LONG-TERM CURRENT USE OF INSULIN (HCC): ICD-10-CM

## 2022-06-27 DIAGNOSIS — E55.9 VITAMIN D DEFICIENCY: ICD-10-CM

## 2022-06-27 PROCEDURE — 99214 OFFICE O/P EST MOD 30 MIN: CPT | Performed by: NURSE PRACTITIONER

## 2022-06-27 NOTE — PROGRESS NOTES
Chief Complaint   Patient presents with    Labs     Pt being seen for labs    1. Have you been to the ER, urgent care clinic since your last visit? Hospitalized since your last visit? No    2. Have you seen or consulted any other health care providers outside of the [address] [place] [address] since your last visit? Include any pap smears or colon screening.  No     Pt has no other concerns

## 2022-06-27 NOTE — PROGRESS NOTES
HISTORY OF PRESENT ILLNESS  Erin Mejias is a 58 y.o. male. HPI  Cardiovascular Review:  The patient has hypertension and hyperlipidemia. Diet and Lifestyle: generally follows a low fat low cholesterol diet, generally follows a low sodium diet, exercises sporadically, nonsmoker  Home BP Monitoring: is not measured at home. Pertinent ROS: taking medications as instructed, no medication side effects noted, no TIA's, no chest pain on exertion, no dyspnea on exertion, no swelling of ankles. Diabetes Mellitus:  He has diabetes mellitus, and  hyperlipidemia. Diabetic ROS - medication compliance: compliant all of the time, diabetic diet compliance: compliant most of the time, home glucose monitoring: is not performed. Lab review: orders written for new lab studies as appropriate; see orders, he admits he has been extremely inconsistent with meds, missing several days at a time, new job and can't remember to take them on time. Patient Active Problem List    Diagnosis Date Noted    Vitamin D deficiency 12/12/2018    Type 2 diabetes with nephropathy ([facility] 08/07/2018    HTN (hypertension) 11/18/2014    Anxiety 09/03/2014    Diabetes mellitus type 2, controlled ([facility] 03/13/2012    Hypercholesterolemia 02/15/2011    RA (rheumatoid arthritis) ([facility] 05/25/2010    ED (erectile dysfunction) 05/25/2010     Current Outpatient Medications   Medication Sig Dispense Refill    lisinopriL (PRINIVIL, ZESTRIL) 20 mg tablet Take 1 tablet by mouth once daily 90 Tablet 0    atorvastatin (LIPITOR) 20 mg tablet Take 1 tablet by mouth once daily 90 Tablet 0    predniSONE (STERAPRED DS) 10 mg dose pack See administration instruction per 10mg dose pack - 6 days 21 Tablet 0    metFORMIN ER (GLUCOPHAGE XR) 500 mg tablet TAKE 2 TABLETS BY MOUTH TWICE DAILY BEFORE BREAKFAST AND BEFORE SUPPER 360 Tablet 0    methocarbamoL (ROBAXIN) 500 mg tablet Take 1 Tablet by mouth three (3) times daily as needed for Muscle Spasm(s).  27 Tablet 1    nystatin (MYCOSTATIN) topical cream Apply  to affected area two (2) times daily as needed for Skin Irritation. 60 g 1    glimepiride (AMARYL) 2 mg tablet TAKE 1 TABLET BY MOUTH ONCE DAILY BEFORE SUPPER 90 Tablet 0    ergocalciferol (Vitamin D2) 1,250 mcg (50,000 unit) capsule Take 1 Cap by mouth every seven (7) days. 12 Cap 3    gabapentin (NEURONTIN) 100 mg capsule Take 1 Cap by mouth two (2) times a day. Max Daily Amount: 200 mg. 60 Cap 2    flash glucose sensor ([employer] Eliud 14 Day Sensor) kit Change sensor as directed every 2 weeks 2 Kit 11    linaGLIPtin (TRADJENTA) 5 mg tablet Take 1 Tab by mouth daily. 30 Tab 5    mometasone (ELOCON) 0.1 % topical cream Apply  to affected area two (2) times daily as needed for Skin Irritation. 45 g 1    meclizine (ANTIVERT) 25 mg tablet Take 1 Tab by mouth three (3) times daily as needed. For vertigo 60 Tab 1    ibuprofen (MOTRIN) 800 mg tablet Take 1 Tab by mouth every eight (8) hours as needed for Pain. 60 Tab 1    ENBREL SURECLICK 50 mg/mL (1.62 mL) injection       zolpidem (AMBIEN) 10 mg tablet Take 1 Tab by mouth nightly as needed for Sleep. 30 Tab 0    flash glucose scanning reader ([employer] Eliud 14 Day [place]) [facility] To be used with sensor for blood sugar monitoring - dx E11.9 1 Each 1     Family History   Problem Relation Age of Onset    No Known Problems Mother     Arthritis-rheumatoid Father      Social History     Tobacco Use    Smoking status: Former Smoker     Years: 2.00     Types: Cigarettes     Quit date: 10/10/2009     Years since quittin.7    Smokeless tobacco: Never Used   Substance Use Topics    Alcohol use: Yes     Alcohol/week: 0.0 standard drinks     Comment: occas.            ROS  A comprehensive review of system was obtained and negative except findings in the HPI    Visit Vitals  /69 (BP 1 Location: Right arm, BP Patient Position: Sitting)   Pulse 63   Temp 98.5 °F (36.9 °C) (Oral)   Resp 16   Ht 5' 6\" (1.676 m)   Wt 162 lb (73.5 kg)   SpO2 96%   BMI 26.15 kg/m²     Physical Exam  Vitals and nursing note reviewed. Constitutional:       Appearance: Normal appearance. Cardiovascular:      Rate and Rhythm: Normal rate and regular rhythm. Heart sounds: Normal heart sounds. Pulmonary:      Breath sounds: Normal breath sounds. Musculoskeletal:         General: No swelling. Neurological:      Mental Status: He is alert. ASSESSMENT and PLAN  Encounter Diagnoses   Name Primary?  Hypercholesterolemia Yes    Controlled type 2 diabetes mellitus without complication, without long-term current use of insulin (HCC)     Vitamin D deficiency     Essential hypertension      Orders Placed This Encounter    LIPID PANEL    HEMOGLOBIN A1C WITH EAG    CBC WITH AUTOMATED DIFF    METABOLIC PANEL, COMPREHENSIVE    VITAMIN D, 25 HYDROXY     Labs updated  Will expect the labs to be very high, will recheck in 3 months and reviewed ideas on how to come up with a new routine to remember his meds    I have discussed the diagnosis with the patient and the intended plan as seen in the above orders. The patient has received an after-visit summary and questions were answered concerning future plans. Patient conveyed understanding of the plan at the time of the visit.     Alycia Quinones, MSN, ANP  6/27/2022

## 2022-06-28 LAB
25(OH)D3 SERPL-MCNC: 20.4 NG/ML (ref 30–100)
ALBUMIN SERPL-MCNC: 3.9 G/DL (ref 3.5–5)
ALBUMIN/GLOB SERPL: 1.1 {RATIO} (ref 1.1–2.2)
ALP SERPL-CCNC: 111 U/L (ref 45–117)
ALT SERPL-CCNC: 25 U/L (ref 12–78)
ANION GAP SERPL CALC-SCNC: 8 MMOL/L (ref 5–15)
AST SERPL-CCNC: 11 U/L (ref 15–37)
BASOPHILS # BLD: 0 K/UL (ref 0–0.1)
BASOPHILS NFR BLD: 1 % (ref 0–1)
BILIRUB SERPL-MCNC: 0.4 MG/DL (ref 0.2–1)
BUN SERPL-MCNC: 12 MG/DL (ref 6–20)
BUN/CREAT SERPL: 10 (ref 12–20)
CALCIUM SERPL-MCNC: 9.6 MG/DL (ref 8.5–10.1)
CHLORIDE SERPL-SCNC: 105 MMOL/L (ref 97–108)
CHOLEST SERPL-MCNC: 253 MG/DL
CO2 SERPL-SCNC: 25 MMOL/L (ref 21–32)
CREAT SERPL-MCNC: 1.16 MG/DL (ref 0.7–1.3)
DIFFERENTIAL METHOD BLD: ABNORMAL
EOSINOPHIL # BLD: 0.1 K/UL (ref 0–0.4)
EOSINOPHIL NFR BLD: 2 % (ref 0–7)
ERYTHROCYTE [DISTWIDTH] IN BLOOD BY AUTOMATED COUNT: 12.7 % (ref 11.5–14.5)
EST. AVERAGE GLUCOSE BLD GHB EST-MCNC: 298 MG/DL
GLOBULIN SER CALC-MCNC: 3.7 G/DL (ref 2–4)
GLUCOSE SERPL-MCNC: 430 MG/DL (ref 65–100)
HBA1C MFR BLD: 12 % (ref 4–5.6)
HCT VFR BLD AUTO: 45.5 % (ref 36.6–50.3)
HDLC SERPL-MCNC: 51 MG/DL
HDLC SERPL: 5 {RATIO} (ref 0–5)
HGB BLD-MCNC: 14.6 G/DL (ref 12.1–17)
IMM GRANULOCYTES # BLD AUTO: 0 K/UL (ref 0–0.04)
IMM GRANULOCYTES NFR BLD AUTO: 0 % (ref 0–0.5)
LDLC SERPL CALC-MCNC: 167.6 MG/DL (ref 0–100)
LYMPHOCYTES # BLD: 1.9 K/UL (ref 0.8–3.5)
LYMPHOCYTES NFR BLD: 44 % (ref 12–49)
MCH RBC QN AUTO: 27.2 PG (ref 26–34)
MCHC RBC AUTO-ENTMCNC: 32.1 G/DL (ref 30–36.5)
MCV RBC AUTO: 84.9 FL (ref 80–99)
MONOCYTES # BLD: 0.2 K/UL (ref 0–1)
MONOCYTES NFR BLD: 6 % (ref 5–13)
NEUTS SEG # BLD: 2.1 K/UL (ref 1.8–8)
NEUTS SEG NFR BLD: 47 % (ref 32–75)
NRBC # BLD: 0 K/UL (ref 0–0.01)
NRBC BLD-RTO: 0 PER 100 WBC
PLATELET # BLD AUTO: 146 K/UL (ref 150–400)
PMV BLD AUTO: 11.5 FL (ref 8.9–12.9)
POTASSIUM SERPL-SCNC: 4.1 MMOL/L (ref 3.5–5.1)
PROT SERPL-MCNC: 7.6 G/DL (ref 6.4–8.2)
RBC # BLD AUTO: 5.36 M/UL (ref 4.1–5.7)
SODIUM SERPL-SCNC: 138 MMOL/L (ref 136–145)
TRIGL SERPL-MCNC: 172 MG/DL (ref ?–150)
VLDLC SERPL CALC-MCNC: 34.4 MG/DL
WBC # BLD AUTO: 4.3 K/UL (ref 4.1–11.1)

## 2022-06-29 NOTE — PROGRESS NOTES
Vitamin D improved, but still low. Find out current dosing. A1c uncontrolled at 12.0. Lipids uncontrolled.   Focus on medication compliance and recheck in 3 monthsSignificantly elevated glucose, CMP otherwise normalNormal CBC

## 2023-02-07 RX ORDER — LISINOPRIL 20 MG/1
TABLET ORAL
Qty: 90 TABLET | Refills: 0 | Status: SHIPPED | OUTPATIENT
Start: 2023-02-07

## 2023-02-07 RX ORDER — ATORVASTATIN CALCIUM 20 MG/1
TABLET, FILM COATED ORAL
Qty: 90 TABLET | Refills: 0 | Status: SHIPPED | OUTPATIENT
Start: 2023-02-07

## 2023-02-23 ENCOUNTER — OFFICE VISIT (OUTPATIENT)
Dept: FAMILY MEDICINE CLINIC | Age: 63
End: 2023-02-23
Payer: COMMERCIAL

## 2023-02-23 VITALS
SYSTOLIC BLOOD PRESSURE: 125 MMHG | OXYGEN SATURATION: 97 % | BODY MASS INDEX: 26.36 KG/M2 | TEMPERATURE: 97.9 F | WEIGHT: 164 LBS | RESPIRATION RATE: 18 BRPM | DIASTOLIC BLOOD PRESSURE: 73 MMHG | HEIGHT: 66 IN | HEART RATE: 57 BPM

## 2023-02-23 DIAGNOSIS — E78.00 HYPERCHOLESTEROLEMIA: ICD-10-CM

## 2023-02-23 DIAGNOSIS — E11.42 DIABETIC POLYNEUROPATHY ASSOCIATED WITH TYPE 2 DIABETES MELLITUS (HCC): Primary | ICD-10-CM

## 2023-02-23 DIAGNOSIS — E55.9 VITAMIN D DEFICIENCY: ICD-10-CM

## 2023-02-23 DIAGNOSIS — I10 ESSENTIAL HYPERTENSION: ICD-10-CM

## 2023-02-23 PROCEDURE — 3052F HG A1C>EQUAL 8.0%<EQUAL 9.0%: CPT | Performed by: NURSE PRACTITIONER

## 2023-02-23 PROCEDURE — 99214 OFFICE O/P EST MOD 30 MIN: CPT | Performed by: NURSE PRACTITIONER

## 2023-02-23 PROCEDURE — 3074F SYST BP LT 130 MM HG: CPT | Performed by: NURSE PRACTITIONER

## 2023-02-23 PROCEDURE — 3078F DIAST BP <80 MM HG: CPT | Performed by: NURSE PRACTITIONER

## 2023-02-23 RX ORDER — PREGABALIN 75 MG/1
75 CAPSULE ORAL 2 TIMES DAILY
Qty: 60 CAPSULE | Refills: 1 | Status: SHIPPED | OUTPATIENT
Start: 2023-02-23

## 2023-02-23 RX ORDER — BLOOD-GLUCOSE SENSOR
EACH MISCELLANEOUS
Qty: 2 EACH | Refills: 5 | Status: SHIPPED | OUTPATIENT
Start: 2023-02-23

## 2023-02-23 NOTE — PROGRESS NOTES
Chief Complaint   Patient presents with    Follow-up    Labs    Diabetes     Pt being seen for fuv for diabetes  -labs    Pt states that he has a med he wants to discuss with the provider     1. Have you been to the ER, urgent care clinic since your last visit? Hospitalized since your last visit? No    2. Have you seen or consulted any other health care providers outside of the [address] [place] [address] since your last visit? Include any pap smears or colon screening.  No    Pt has no other concerns

## 2023-02-24 LAB
25(OH)D3 SERPL-MCNC: 21.9 NG/ML (ref 30–100)
ALBUMIN SERPL-MCNC: 3.8 G/DL (ref 3.5–5)
ALBUMIN/GLOB SERPL: 1.2 (ref 1.1–2.2)
ALP SERPL-CCNC: 78 U/L (ref 45–117)
ALT SERPL-CCNC: 24 U/L (ref 12–78)
ANION GAP SERPL CALC-SCNC: 4 MMOL/L (ref 5–15)
AST SERPL-CCNC: 10 U/L (ref 15–37)
BASOPHILS # BLD: 0 K/UL (ref 0–0.1)
BASOPHILS NFR BLD: 1 % (ref 0–1)
BILIRUB SERPL-MCNC: 0.3 MG/DL (ref 0.2–1)
BUN SERPL-MCNC: 8 MG/DL (ref 6–20)
BUN/CREAT SERPL: 8 (ref 12–20)
CALCIUM SERPL-MCNC: 8.8 MG/DL (ref 8.5–10.1)
CHLORIDE SERPL-SCNC: 109 MMOL/L (ref 97–108)
CHOLEST SERPL-MCNC: 131 MG/DL
CO2 SERPL-SCNC: 29 MMOL/L (ref 21–32)
CREAT SERPL-MCNC: 0.98 MG/DL (ref 0.7–1.3)
CREAT UR-MCNC: 147 MG/DL
DIFFERENTIAL METHOD BLD: NORMAL
EOSINOPHIL # BLD: 0.1 K/UL (ref 0–0.4)
EOSINOPHIL NFR BLD: 2 % (ref 0–7)
ERYTHROCYTE [DISTWIDTH] IN BLOOD BY AUTOMATED COUNT: 13.3 % (ref 11.5–14.5)
EST. AVERAGE GLUCOSE BLD GHB EST-MCNC: 203 MG/DL
GLOBULIN SER CALC-MCNC: 3.2 G/DL (ref 2–4)
GLUCOSE SERPL-MCNC: 187 MG/DL (ref 65–100)
HBA1C MFR BLD: 8.7 % (ref 4–5.6)
HCT VFR BLD AUTO: 42.4 % (ref 36.6–50.3)
HDLC SERPL-MCNC: 36 MG/DL
HDLC SERPL: 3.6 (ref 0–5)
HGB BLD-MCNC: 12.8 G/DL (ref 12.1–17)
IMM GRANULOCYTES # BLD AUTO: 0 K/UL (ref 0–0.04)
IMM GRANULOCYTES NFR BLD AUTO: 0 % (ref 0–0.5)
LDLC SERPL CALC-MCNC: 77.4 MG/DL (ref 0–100)
LYMPHOCYTES # BLD: 1.7 K/UL (ref 0.8–3.5)
LYMPHOCYTES NFR BLD: 40 % (ref 12–49)
MCH RBC QN AUTO: 27.4 PG (ref 26–34)
MCHC RBC AUTO-ENTMCNC: 30.2 G/DL (ref 30–36.5)
MCV RBC AUTO: 90.6 FL (ref 80–99)
MICROALBUMIN UR-MCNC: 5.33 MG/DL
MICROALBUMIN/CREAT UR-RTO: 36 MG/G (ref 0–30)
MONOCYTES # BLD: 0.3 K/UL (ref 0–1)
MONOCYTES NFR BLD: 8 % (ref 5–13)
NEUTS SEG # BLD: 2.1 K/UL (ref 1.8–8)
NEUTS SEG NFR BLD: 49 % (ref 32–75)
NRBC # BLD: 0 K/UL (ref 0–0.01)
NRBC BLD-RTO: 0 PER 100 WBC
PLATELET # BLD AUTO: 179 K/UL (ref 150–400)
PMV BLD AUTO: 11.2 FL (ref 8.9–12.9)
POTASSIUM SERPL-SCNC: 4.2 MMOL/L (ref 3.5–5.1)
PROT SERPL-MCNC: 7 G/DL (ref 6.4–8.2)
RBC # BLD AUTO: 4.68 M/UL (ref 4.1–5.7)
SODIUM SERPL-SCNC: 142 MMOL/L (ref 136–145)
TRIGL SERPL-MCNC: 88 MG/DL (ref ?–150)
VLDLC SERPL CALC-MCNC: 17.6 MG/DL
WBC # BLD AUTO: 4.2 K/UL (ref 4.1–11.1)

## 2023-03-01 RX ORDER — METFORMIN HYDROCHLORIDE 500 MG/1
TABLET, EXTENDED RELEASE ORAL
Qty: 360 TABLET | Refills: 0 | Status: SHIPPED | OUTPATIENT
Start: 2023-03-01

## 2023-03-01 NOTE — PROGRESS NOTES
HISTORY OF PRESENT ILLNESS  Emmy Pearson is a 61 y.o. male. HPI  Cardiovascular Review:  The patient has hypertension and hyperlipidemia. Diet and Lifestyle: generally follows a low fat low cholesterol diet, generally follows a low sodium diet, sedentary  Home BP Monitoring: is not measured at home. Pertinent ROS: taking medications as instructed, no medication side effects noted, no TIA's, no chest pain on exertion, no dyspnea on exertion, no swelling of ankles. Diabetes Mellitus:  He has diabetes mellitus, and  hyperlipidemia. Diabetic ROS - medication compliance: compliant all of the time, diabetic diet compliance: compliant most of the time, home glucose monitoring: is not performed. Lab review: orders written for new lab studies as appropriate; see orders. Vit D def: On meds for vit D, needs labs updated    Patient Active Problem List    Diagnosis Date Noted    Vitamin D deficiency 12/12/2018    Type 2 diabetes with nephropathy ([facility] 08/07/2018    HTN (hypertension) 11/18/2014    Anxiety 09/03/2014    Diabetes mellitus type 2, [facility] ([facility] 03/13/2012    Hypercholesterolemia 02/15/2011    RA (rheumatoid arthritis) ([facility] 05/25/2010    ED (erectile dysfunction) 05/25/2010     Current Outpatient Medications   Medication Sig Dispense Refill    pregabalin (LYRICA) 75 mg capsule Take 1 Capsule by mouth two (2) times a day. Max Daily Amount: 150 mg. 60 Capsule 1    Blood-Glucose Sensor ([employer] Eliud 3 Sensor) rashaun Glucose monitoring daily dx:E11.9 2 Each 5    atorvastatin (LIPITOR) 20 mg tablet Take 1 tablet by mouth once daily 90 Tablet 0    lisinopriL (PRINIVIL, ZESTRIL) 20 mg tablet Take 1 tablet by mouth once daily 90 Tablet 0    metFORMIN ER (GLUCOPHAGE XR) 500 mg tablet TAKE 2 TABLETS BY MOUTH TWICE DAILY BEFORE BREAKFAST AND BEFORE SUPPER 360 Tablet 0    methocarbamoL (ROBAXIN) 500 mg tablet Take 1 Tablet by mouth three (3) times daily as needed for Muscle Spasm(s).  30 Tablet 1 glimepiride (AMARYL) 2 mg tablet TAKE 1 TABLET BY MOUTH ONCE DAILY BEFORE SUPPER 90 Tablet 0    ergocalciferol (Vitamin D2) 1,250 mcg (50,000 unit) capsule Take 1 Cap by mouth every seven (7) days. 12 Cap 3    gabapentin (NEURONTIN) 100 mg capsule Take 1 Cap by mouth two (2) times a day. Max Daily Amount: 200 mg. 60 Cap 2    linaGLIPtin (TRADJENTA) 5 mg tablet Take 1 Tab by mouth daily. 30 Tab 5    meclizine (ANTIVERT) 25 mg tablet Take 1 Tab by mouth three (3) times daily as needed. For vertigo 60 Tab 1    ibuprofen (MOTRIN) 800 mg tablet Take 1 Tab by mouth every eight (8) hours as needed for Pain. 60 Tab 1    ENBREL SURECLICK 50 mg/mL (7.57 mL) injection       predniSONE (STERAPRED DS) 10 mg dose pack See administration instruction per 10mg dose pack - 6 days (Patient not taking: Reported on 2023) 21 Tablet 0    nystatin (MYCOSTATIN) topical cream Apply  to affected area two (2) times daily as needed for Skin Irritation. (Patient not taking: Reported on 2023) 60 g 1    flash glucose scanning reader ([employer] Eliud 14 Day [place]) [facility] To be used with sensor for blood sugar monitoring - dx E11.9 (Patient not taking: Reported on 2023) 1 Each 1    flash glucose sensor ([employer] Eliud 14 Day Sensor) kit Change sensor as directed every 2 weeks (Patient not taking: Reported on 2023) 2 Kit 11    mometasone (ELOCON) 0.1 % topical cream Apply  to affected area two (2) times daily as needed for Skin Irritation. (Patient not taking: Reported on 2023) 45 g 1    zolpidem (AMBIEN) 10 mg tablet Take 1 Tab by mouth nightly as needed for Sleep.  (Patient not taking: Reported on 2023) 30 Tab 0     Family History   Problem Relation Age of Onset    No Known Problems Mother     Arthritis-rheumatoid Father      Social History     Tobacco Use    Smoking status: Former     Years: 2.00     Types: Cigarettes     Quit date: 10/10/2009     Years since quittin.3    Smokeless tobacco: Never   Substance Use Topics    Alcohol use: Yes     Alcohol/week: 0.0 standard drinks     Comment: occas. ROS  A comprehensive review of system was obtained and negative except findings in the HPI    Visit Vitals  /73 (BP 1 Location: Right arm, BP Patient Position: Sitting)   Pulse (!) 57   Temp 97.9 °F (36.6 °C) (Oral)   Resp 18   Ht 5' 6\" (1.676 m)   Wt 164 lb (74.4 kg)   SpO2 97%   BMI 26.47 kg/m²       Physical Exam  Vitals and nursing note reviewed. Constitutional:       Appearance: Normal appearance. Cardiovascular:      Rate and Rhythm: Normal rate and regular rhythm. Heart sounds: Normal heart sounds. Neurological:      Mental Status: He is alert. ASSESSMENT and PLAN  Encounter Diagnoses   Name Primary? Diabetic polyneuropathy associated with type 2 diabetes mellitus (HCC) Yes    Hypercholesterolemia     Essential hypertension     Vitamin D deficiency      Orders Placed This Encounter    LIPID PANEL    METABOLIC PANEL, COMPREHENSIVE    CBC WITH AUTOMATED DIFF    HEMOGLOBIN A1C WITH EAG    MICROALBUMIN, UR, RAND W/ MICROALB/CREAT RATIO    VITAMIN D, 25 HYDROXY    pregabalin (LYRICA) 75 mg capsule    Blood-Glucose Sensor ([employer] Eliud 3 Sensor) rashaun     Given order for [employer] updated today  Given Lyrica 75mg bid for neuropathy pain  Follow up 3 mo  I have discussed the diagnosis with the patient and the intended plan as seen in the above orders. The patient has received an after-visit summary and questions were answered concerning future plans. Patient conveyed understanding of the plan at the time of the visit.     Yanelis Rogers, MSN, ANP  2/28/2023

## 2023-04-26 ENCOUNTER — TELEPHONE (OUTPATIENT)
Dept: FAMILY MEDICINE CLINIC | Age: 63
End: 2023-04-26

## 2023-05-09 ENCOUNTER — TELEPHONE (OUTPATIENT)
Age: 63
End: 2023-05-09

## 2023-05-09 NOTE — TELEPHONE ENCOUNTER
Spoke with pt, he states that the form is for him to be able to get a free style timothy 3. He states that if he gets it from the regular pharmacy it is 76 but if he gets it through his insurance it is cheaper. Encouraged pt to call insurance to make sure they will cover this.  Sending to provider so she knows this is on her desk

## 2023-05-18 RX ORDER — BLOOD-GLUCOSE SENSOR
EACH MISCELLANEOUS
Qty: 2 EACH | Refills: 5 | Status: SHIPPED | OUTPATIENT
Start: 2023-05-18

## 2023-06-21 ENCOUNTER — HOSPITAL ENCOUNTER (EMERGENCY)
Facility: HOSPITAL | Age: 63
Discharge: HOME | End: 2023-06-21
Attending: EMERGENCY MEDICINE
Payer: COMMERCIAL

## 2023-06-21 VITALS
BODY MASS INDEX: 27.32 KG/M2 | TEMPERATURE: 98 F | HEART RATE: 70 BPM | SYSTOLIC BLOOD PRESSURE: 170 MMHG | HEIGHT: 66 IN | RESPIRATION RATE: 18 BRPM | WEIGHT: 170 LBS | DIASTOLIC BLOOD PRESSURE: 91 MMHG | OXYGEN SATURATION: 100 %

## 2023-06-21 DIAGNOSIS — M62.830 SPASM OF MUSCLE OF LOWER BACK: Primary | ICD-10-CM

## 2023-06-21 PROCEDURE — 99284 EMERGENCY DEPT VISIT MOD MDM: CPT

## 2023-06-21 PROCEDURE — 6370000000 HC RX 637 (ALT 250 FOR IP): Performed by: EMERGENCY MEDICINE

## 2023-06-21 PROCEDURE — 6360000002 HC RX W HCPCS: Performed by: EMERGENCY MEDICINE

## 2023-06-21 PROCEDURE — 96372 THER/PROPH/DIAG INJ SC/IM: CPT

## 2023-06-21 PROCEDURE — 2580000003 HC RX 258: Performed by: EMERGENCY MEDICINE

## 2023-06-21 RX ORDER — CLONIDINE HYDROCHLORIDE 0.1 MG/1
0.1 TABLET ORAL
Status: COMPLETED | OUTPATIENT
Start: 2023-06-21 | End: 2023-06-21

## 2023-06-21 RX ORDER — METHOCARBAMOL 750 MG/1
750 TABLET, FILM COATED ORAL 4 TIMES DAILY
Qty: 40 TABLET | Refills: 0 | Status: SHIPPED | OUTPATIENT
Start: 2023-06-21 | End: 2023-07-01

## 2023-06-21 RX ORDER — LISINOPRIL 20 MG/1
40 TABLET ORAL
Status: COMPLETED | OUTPATIENT
Start: 2023-06-21 | End: 2023-06-21

## 2023-06-21 RX ORDER — KETOROLAC TROMETHAMINE 30 MG/ML
60 INJECTION, SOLUTION INTRAMUSCULAR; INTRAVENOUS ONCE
Status: COMPLETED | OUTPATIENT
Start: 2023-06-21 | End: 2023-06-21

## 2023-06-21 RX ORDER — METHOCARBAMOL 500 MG/1
750 TABLET, FILM COATED ORAL
Status: COMPLETED | OUTPATIENT
Start: 2023-06-21 | End: 2023-06-21

## 2023-06-21 RX ORDER — IBUPROFEN 800 MG/1
800 TABLET ORAL EVERY 8 HOURS PRN
Qty: 30 TABLET | Refills: 0 | Status: SHIPPED | OUTPATIENT
Start: 2023-06-21 | End: 2023-07-01

## 2023-06-21 RX ORDER — PREDNISONE 20 MG/1
20 TABLET ORAL DAILY
Qty: 5 TABLET | Refills: 0 | Status: SHIPPED | OUTPATIENT
Start: 2023-06-21 | End: 2023-06-26

## 2023-06-21 RX ORDER — IBUPROFEN 800 MG/1
800 TABLET ORAL
Status: DISCONTINUED | OUTPATIENT
Start: 2023-06-21 | End: 2023-06-21

## 2023-06-21 RX ADMIN — METHYLPREDNISOLONE SODIUM SUCCINATE 125 MG: 125 INJECTION INTRAMUSCULAR; INTRAVENOUS at 16:43

## 2023-06-21 RX ADMIN — KETOROLAC TROMETHAMINE 60 MG: 60 INJECTION, SOLUTION INTRAMUSCULAR at 16:42

## 2023-06-21 RX ADMIN — METHOCARBAMOL TABLETS 750 MG: 500 TABLET, COATED ORAL at 16:42

## 2023-06-21 RX ADMIN — LISINOPRIL 40 MG: 20 TABLET ORAL at 16:41

## 2023-06-21 RX ADMIN — CLONIDINE HYDROCHLORIDE 0.1 MG: 0.1 TABLET ORAL at 16:41

## 2023-06-21 ASSESSMENT — PAIN DESCRIPTION - DESCRIPTORS: DESCRIPTORS: ACHING

## 2023-06-21 ASSESSMENT — PAIN DESCRIPTION - ORIENTATION: ORIENTATION: MID

## 2023-06-21 ASSESSMENT — PAIN DESCRIPTION - LOCATION: LOCATION: BACK

## 2023-06-21 ASSESSMENT — PAIN SCALES - GENERAL: PAINLEVEL_OUTOF10: 10

## 2023-06-21 NOTE — ED TRIAGE NOTES
Patient reports that he has had progressively worsening middle back pain that is worse with movement patient also has hx of HTN and has not taken his medication

## 2023-06-21 NOTE — ED PROVIDER NOTES
[address] [facility]  EMERGENCY DEPARTMENT HISTORY AND PHYSICAL EXAM      Date: 2023  Patient Name: Alva Jang  MRN: [id number]  YOB: 1960  Date of evaluation: 2023  Provider: Amor Chung MD   Note Started: 3:48 PM EDT 23    HISTORY OF PRESENT ILLNESS     Chief Complaint   Patient presents with    Back Pain       History Provided By: Patient    HPI: Alva Jang is a 61 y.o. male     PAST MEDICAL HISTORY   Past Medical History:  Past Medical History:   Diagnosis Date    Diabetes [facility])     ED (erectile dysfunction) 2010    Hypertension     RA (rheumatoid arthritis) ([facility] [address] 2010       Past Surgical History:  History reviewed. No pertinent surgical history. Family History:  Family History   Problem Relation Age of Onset    No Known Problems Mother     Rheum Arthritis Father        Social History:  Social History     Tobacco Use    Smoking status: Former     Types: Cigarettes     Quit date: 10/10/2009     Years since quittin.7    Smokeless tobacco: Never   Substance Use Topics    Alcohol use: Yes     Alcohol/week: 0.0 standard drinks    Drug use: No       Allergies:  No Known Allergies    PCP: MARISA Wilde NP    Current Meds:   No current facility-administered medications for this encounter. Current Outpatient Medications   Medication Sig Dispense Refill    Continuous Blood Gluc Sensor (FREESTYLE TIMBO 3 SENSOR) [facility] To be used for blood sugar monitoring dx: E11.9 2 each 5    atorvastatin (LIPITOR) 20 MG tablet Take 1 tablet by mouth once daily      ergocalciferol (ERGOCALCIFEROL) 1.25 MG (51760 UT) capsule Take by mouth every 7 days      Etanercept (ENBREL SURECLICK) 50 MG/ML SOAJ ceived the following from [facility]: Outside name: ENBREL SURECLICK 50 mg/mL (3.63 mL) injection      gabapentin (NEURONTIN) 100 MG capsule Take by mouth 2 times daily.       glimepiride (AMARYL) 2 MG

## 2023-08-08 ENCOUNTER — CLINICAL DOCUMENTATION (OUTPATIENT)
Age: 63
End: 2023-08-08

## 2023-12-05 ENCOUNTER — TRANSCRIBE ORDERS (OUTPATIENT)
Facility: HOSPITAL | Age: 63
End: 2023-12-05

## 2023-12-05 DIAGNOSIS — M54.9 BACK PAIN, UNSPECIFIED BACK LOCATION, UNSPECIFIED BACK PAIN LATERALITY, UNSPECIFIED CHRONICITY: Primary | ICD-10-CM

## 2023-12-05 DIAGNOSIS — M51.24 DISPLACEMENT OF THORACIC INTERVERTEBRAL DISC WITHOUT MYELOPATHY: ICD-10-CM

## 2023-12-05 DIAGNOSIS — M51.34 DDD (DEGENERATIVE DISC DISEASE), THORACIC: ICD-10-CM

## 2024-06-03 ENCOUNTER — HOSPITAL ENCOUNTER (OUTPATIENT)
Facility: HOSPITAL | Age: 64
Discharge: HOME | End: 2024-06-06
Payer: COMMERCIAL

## 2024-06-03 DIAGNOSIS — R10.12 ABDOMINAL PAIN, LEFT UPPER QUADRANT: ICD-10-CM

## 2024-06-03 PROCEDURE — 76700 US EXAM ABDOM COMPLETE: CPT

## 2024-06-27 ENCOUNTER — HOSPITAL ENCOUNTER (OUTPATIENT)
Facility: HOSPITAL | Age: 64
Discharge: HOME | End: 2024-06-27
Payer: COMMERCIAL

## 2024-06-27 DIAGNOSIS — K82.9 DISEASE OF GALLBLADDER: ICD-10-CM

## 2024-06-27 DIAGNOSIS — R10.12 LEFT UPPER QUADRANT PAIN: ICD-10-CM

## 2024-06-27 PROCEDURE — 78226 HEPATOBILIARY SYSTEM IMAGING: CPT

## 2024-06-27 PROCEDURE — 3430000000 HC RX DIAGNOSTIC RADIOPHARMACEUTICAL: Performed by: NURSE PRACTITIONER

## 2024-06-27 PROCEDURE — A9537 TC99M MEBROFENIN: HCPCS | Performed by: NURSE PRACTITIONER

## 2024-06-27 RX ORDER — KIT FOR THE PREPARATION OF TECHNETIUM TC 99M MEBROFENIN 45 MG/10ML
5 INJECTION, POWDER, LYOPHILIZED, FOR SOLUTION INTRAVENOUS
Status: COMPLETED | OUTPATIENT
Start: 2024-06-27 | End: 2024-06-27

## 2024-06-27 RX ADMIN — MEBROFENIN 5 MILLICURIE: 45 INJECTION, POWDER, LYOPHILIZED, FOR SOLUTION INTRAVENOUS at 06:48

## 2024-10-02 ENCOUNTER — APPOINTMENT (OUTPATIENT)
Facility: HOSPITAL | Age: 64
End: 2024-10-02
Payer: COMMERCIAL

## 2024-10-02 ENCOUNTER — HOSPITAL ENCOUNTER (INPATIENT)
Facility: HOSPITAL | Age: 64
LOS: 2 days | Discharge: HOME | End: 2024-10-04
Attending: EMERGENCY MEDICINE | Admitting: HOSPITALIST
Payer: COMMERCIAL

## 2024-10-02 DIAGNOSIS — I63.9 ACUTE ISCHEMIC STROKE (HCC): Primary | ICD-10-CM

## 2024-10-02 LAB
ALBUMIN SERPL-MCNC: 3.6 G/DL (ref 3.5–5)
ALBUMIN/GLOB SERPL: 0.8 (ref 1.1–2.2)
ALP SERPL-CCNC: 111 U/L (ref 45–117)
ALT SERPL-CCNC: 21 U/L (ref 12–78)
ANION GAP SERPL CALC-SCNC: 4 MMOL/L (ref 2–12)
AST SERPL-CCNC: 48 U/L (ref 15–37)
BILIRUB SERPL-MCNC: 0.4 MG/DL (ref 0.2–1)
BUN SERPL-MCNC: 26 MG/DL (ref 6–20)
BUN/CREAT SERPL: 19 (ref 12–20)
CALCIUM SERPL-MCNC: 10.1 MG/DL (ref 8.5–10.1)
CHLORIDE SERPL-SCNC: 108 MMOL/L (ref 97–108)
CO2 SERPL-SCNC: 25 MMOL/L (ref 21–32)
COMMENT:: NORMAL
CREAT SERPL-MCNC: 1.35 MG/DL (ref 0.7–1.3)
EKG ATRIAL RATE: 76 BPM
EKG DIAGNOSIS: NORMAL
EKG P AXIS: 70 DEGREES
EKG P-R INTERVAL: 162 MS
EKG Q-T INTERVAL: 360 MS
EKG QRS DURATION: 80 MS
EKG QTC CALCULATION (BAZETT): 405 MS
EKG R AXIS: 49 DEGREES
EKG T AXIS: -30 DEGREES
EKG VENTRICULAR RATE: 76 BPM
ERYTHROCYTE [DISTWIDTH] IN BLOOD BY AUTOMATED COUNT: 12.7 % (ref 11.5–14.5)
GLOBULIN SER CALC-MCNC: 4.7 G/DL (ref 2–4)
GLUCOSE BLD STRIP.AUTO-MCNC: 183 MG/DL (ref 65–117)
GLUCOSE BLD STRIP.AUTO-MCNC: 308 MG/DL (ref 65–117)
GLUCOSE SERPL-MCNC: 251 MG/DL (ref 65–100)
HCT VFR BLD AUTO: 42.9 % (ref 36.6–50.3)
HGB BLD-MCNC: 13.7 G/DL (ref 12.1–17)
INR PPP: 1 (ref 0.9–1.1)
MCH RBC QN AUTO: 27.7 PG (ref 26–34)
MCHC RBC AUTO-ENTMCNC: 31.9 G/DL (ref 30–36.5)
MCV RBC AUTO: 86.8 FL (ref 80–99)
NRBC # BLD: 0 K/UL (ref 0–0.01)
NRBC BLD-RTO: 0 PER 100 WBC
PLATELET # BLD AUTO: 220 K/UL (ref 150–400)
PMV BLD AUTO: 12.6 FL (ref 8.9–12.9)
POTASSIUM SERPL-SCNC: 5.5 MMOL/L (ref 3.5–5.1)
PROT SERPL-MCNC: 8.3 G/DL (ref 6.4–8.2)
PROTHROMBIN TIME: 10 SEC (ref 9–11.1)
RBC # BLD AUTO: 4.94 M/UL (ref 4.1–5.7)
SERVICE CMNT-IMP: ABNORMAL
SERVICE CMNT-IMP: ABNORMAL
SODIUM SERPL-SCNC: 137 MMOL/L (ref 136–145)
SPECIMEN HOLD: NORMAL
TROPONIN I SERPL HS-MCNC: 8 NG/L (ref 0–76)
WBC # BLD AUTO: 6.1 K/UL (ref 4.1–11.1)

## 2024-10-02 PROCEDURE — 70498 CT ANGIOGRAPHY NECK: CPT

## 2024-10-02 PROCEDURE — 80053 COMPREHEN METABOLIC PANEL: CPT

## 2024-10-02 PROCEDURE — 85027 COMPLETE CBC AUTOMATED: CPT

## 2024-10-02 PROCEDURE — 82962 GLUCOSE BLOOD TEST: CPT

## 2024-10-02 PROCEDURE — 93005 ELECTROCARDIOGRAM TRACING: CPT | Performed by: EMERGENCY MEDICINE

## 2024-10-02 PROCEDURE — 70551 MRI BRAIN STEM W/O DYE: CPT

## 2024-10-02 PROCEDURE — 2060000000 HC ICU INTERMEDIATE R&B

## 2024-10-02 PROCEDURE — 0042T CT BRAIN PERFUSION: CPT

## 2024-10-02 PROCEDURE — 2580000003 HC RX 258: Performed by: HOSPITALIST

## 2024-10-02 PROCEDURE — 85610 PROTHROMBIN TIME: CPT

## 2024-10-02 PROCEDURE — 83036 HEMOGLOBIN GLYCOSYLATED A1C: CPT

## 2024-10-02 PROCEDURE — 6360000004 HC RX CONTRAST MEDICATION: Performed by: EMERGENCY MEDICINE

## 2024-10-02 PROCEDURE — 70450 CT HEAD/BRAIN W/O DYE: CPT

## 2024-10-02 PROCEDURE — 6370000000 HC RX 637 (ALT 250 FOR IP): Performed by: HOSPITALIST

## 2024-10-02 PROCEDURE — 84484 ASSAY OF TROPONIN QUANT: CPT

## 2024-10-02 PROCEDURE — 99285 EMERGENCY DEPT VISIT HI MDM: CPT

## 2024-10-02 RX ORDER — ENOXAPARIN SODIUM 100 MG/ML
40 INJECTION SUBCUTANEOUS DAILY
Status: DISCONTINUED | OUTPATIENT
Start: 2024-10-03 | End: 2024-10-04 | Stop reason: HOSPADM

## 2024-10-02 RX ORDER — ASPIRIN 300 MG/1
300 SUPPOSITORY RECTAL DAILY
Status: DISCONTINUED | OUTPATIENT
Start: 2024-10-02 | End: 2024-10-04 | Stop reason: HOSPADM

## 2024-10-02 RX ORDER — SODIUM CHLORIDE 0.9 % (FLUSH) 0.9 %
5-40 SYRINGE (ML) INJECTION EVERY 12 HOURS SCHEDULED
Status: DISCONTINUED | OUTPATIENT
Start: 2024-10-02 | End: 2024-10-04 | Stop reason: HOSPADM

## 2024-10-02 RX ORDER — SODIUM CHLORIDE 0.9 % (FLUSH) 0.9 %
5-40 SYRINGE (ML) INJECTION PRN
Status: DISCONTINUED | OUTPATIENT
Start: 2024-10-02 | End: 2024-10-04 | Stop reason: HOSPADM

## 2024-10-02 RX ORDER — IOPAMIDOL 755 MG/ML
100 INJECTION, SOLUTION INTRAVASCULAR
Status: COMPLETED | OUTPATIENT
Start: 2024-10-02 | End: 2024-10-02

## 2024-10-02 RX ORDER — POLYETHYLENE GLYCOL 3350 17 G/17G
17 POWDER, FOR SOLUTION ORAL DAILY PRN
Status: DISCONTINUED | OUTPATIENT
Start: 2024-10-02 | End: 2024-10-04 | Stop reason: HOSPADM

## 2024-10-02 RX ORDER — INSULIN LISPRO 100 [IU]/ML
0-8 INJECTION, SOLUTION INTRAVENOUS; SUBCUTANEOUS
Status: DISCONTINUED | OUTPATIENT
Start: 2024-10-02 | End: 2024-10-04 | Stop reason: HOSPADM

## 2024-10-02 RX ORDER — ASPIRIN 81 MG/1
81 TABLET, CHEWABLE ORAL DAILY
Status: DISCONTINUED | OUTPATIENT
Start: 2024-10-02 | End: 2024-10-04 | Stop reason: HOSPADM

## 2024-10-02 RX ORDER — DEXTROSE MONOHYDRATE 100 MG/ML
INJECTION, SOLUTION INTRAVENOUS CONTINUOUS PRN
Status: DISCONTINUED | OUTPATIENT
Start: 2024-10-02 | End: 2024-10-04 | Stop reason: HOSPADM

## 2024-10-02 RX ORDER — SODIUM CHLORIDE 9 MG/ML
INJECTION, SOLUTION INTRAVENOUS CONTINUOUS
Status: DISCONTINUED | OUTPATIENT
Start: 2024-10-02 | End: 2024-10-04 | Stop reason: HOSPADM

## 2024-10-02 RX ORDER — INSULIN LISPRO 100 [IU]/ML
0-4 INJECTION, SOLUTION INTRAVENOUS; SUBCUTANEOUS NIGHTLY
Status: DISCONTINUED | OUTPATIENT
Start: 2024-10-02 | End: 2024-10-04 | Stop reason: HOSPADM

## 2024-10-02 RX ORDER — SODIUM CHLORIDE 9 MG/ML
INJECTION, SOLUTION INTRAVENOUS PRN
Status: DISCONTINUED | OUTPATIENT
Start: 2024-10-02 | End: 2024-10-04 | Stop reason: HOSPADM

## 2024-10-02 RX ORDER — ONDANSETRON 4 MG/1
4 TABLET, ORALLY DISINTEGRATING ORAL EVERY 8 HOURS PRN
Status: DISCONTINUED | OUTPATIENT
Start: 2024-10-02 | End: 2024-10-04 | Stop reason: HOSPADM

## 2024-10-02 RX ORDER — ATORVASTATIN CALCIUM 40 MG/1
80 TABLET, FILM COATED ORAL NIGHTLY
Status: DISCONTINUED | OUTPATIENT
Start: 2024-10-02 | End: 2024-10-04 | Stop reason: HOSPADM

## 2024-10-02 RX ORDER — ONDANSETRON 2 MG/ML
4 INJECTION INTRAMUSCULAR; INTRAVENOUS EVERY 6 HOURS PRN
Status: DISCONTINUED | OUTPATIENT
Start: 2024-10-02 | End: 2024-10-04 | Stop reason: HOSPADM

## 2024-10-02 RX ADMIN — SODIUM CHLORIDE: 9 INJECTION, SOLUTION INTRAVENOUS at 16:07

## 2024-10-02 RX ADMIN — Medication 10 ML: at 21:45

## 2024-10-02 RX ADMIN — ASPIRIN 81 MG CHEWABLE TABLET 81 MG: 81 TABLET CHEWABLE at 16:03

## 2024-10-02 RX ADMIN — INSULIN LISPRO 4 UNITS: 100 INJECTION, SOLUTION INTRAVENOUS; SUBCUTANEOUS at 21:50

## 2024-10-02 RX ADMIN — IOPAMIDOL 40 ML: 755 INJECTION, SOLUTION INTRAVENOUS at 12:45

## 2024-10-02 RX ADMIN — IOPAMIDOL 80 ML: 755 INJECTION, SOLUTION INTRAVENOUS at 12:45

## 2024-10-02 RX ADMIN — ATORVASTATIN CALCIUM 80 MG: 40 TABLET, FILM COATED ORAL at 21:45

## 2024-10-02 RX ADMIN — SODIUM ZIRCONIUM CYCLOSILICATE 10 G: 10 POWDER, FOR SUSPENSION ORAL at 16:04

## 2024-10-02 ASSESSMENT — PAIN - FUNCTIONAL ASSESSMENT: PAIN_FUNCTIONAL_ASSESSMENT: NONE - DENIES PAIN

## 2024-10-02 NOTE — H&P
History and Physical    Date of Service:  10/2/2024  Primary Care Provider: Alysa Herring FNP  Source of information: The patient and Chart review    Chief Complaint: Aphasia      History of Presenting Illness:   Dante Hwang is a 64 y.o. male with PMH significant for type 2 diabetes, hypertension, rheumatoid arthritis on weekly Enbrel injection presents to the ED for strokelike symptoms.  He started with slurring of speech when he woke up this morning.  Last known well around 11:30 PM last night.  He had an appointment with his rheumatologist so he drove himself from [place] to [place] but noticed he had difficulty keeping in the right kristi..  His rheumatologist directed him to come to the ED.  Code stroke evaluation was initiated in the ED.  Noncontrast head CT was negative.  CT perfusion showed small age-indeterminate infarct in the right anterior basal ganglia.  CTA negative for flow-limiting stenosis.  Patient quitted smoking a month ago.  He denied illicit drug use or alcohol.  No previous history of MI or stroke, CHF.  He does not take aspirin or other antiplatelets, anticoagulants.    The patient denies any headache, blurry vision, sore throat, trouble swallowing, trouble with speech, chest pain, SOB, cough, fever, chills, N/V/D, abd pain, urinary symptoms, constipation, recent travels, sick contacts, focal or generalized neurological symptoms, falls, injuries, rashes, contact with COVID-19 diagnosed patients, hematemesis, melena, hemoptysis, hematuria, rashes, denies starting any new medications and denies any other concerns or problems besides as mentioned above.         REVIEW OF SYSTEMS:  A comprehensive review of systems was negative except for that written in the History of Present Illness.     Past Medical History:   Diagnosis Date    Diabetes (HCC)     ED (erectile dysfunction) 5/25/2010    Hypertension     RA (rheumatoid arthritis) ([facility]) 5/25/2010      No past surgical

## 2024-10-02 NOTE — ED PROVIDER NOTES
[facility] EMERGENCY DEP  EMERGENCY DEPARTMENT ENCOUNTER      Pt Name: Dante Hwang  MRN: [id number]  Birthdate 1960  Date of evaluation: 10/2/2024  Provider: Zachariah Richmond MD    CHIEF COMPLAINT       Chief Complaint   Patient presents with    Aphasia         HISTORY OF PRESENT ILLNESS   (Location/Symptom, Timing/Onset, Context/Setting, Quality, Duration, Modifying Factors, Severity)  Note limiting factors.   Mr. Hwang is a 64-year-old male who presents to the ER with concerns of strokelike symptoms.  He lives in [facility].  He had an appointment with a doctor today locally.  When he got up this morning, his speech was slurred.  He said his wife had difficulty understanding him when he is speaking because his words are slurred and he has been difficulty finding the right words.  His speech was like assistance to get up this morning.  He said his speech was normal last night when he went to bed around 11:30 PM.  He went to a drive to his doctor department.  He had a drive a considerable distance.  He felt he had difficulty keeping in the right kristi.  That he had difficulty controlling the car.  No numbness, tingling, or weakness of his arms or legs.  No changes with his vision.  He denies any other complaints.            Review of External Medical Records:     Nursing Notes were reviewed.    REVIEW OF SYSTEMS    (2-9 systems for level 4, 10 or more for level 5)     Review of Systems   Neurological:  Positive for dizziness and speech difficulty.       Except as noted above the remainder of the review of systems was reviewed and negative.       PAST MEDICAL HISTORY     Past Medical History:   Diagnosis Date    Diabetes ([facility])     ED (erectile dysfunction) 5/25/2010    Hypertension     RA (rheumatoid arthritis) ([facility]) 5/25/2010         SURGICAL HISTORY     No past surgical history on file.      CURRENT MEDICATIONS       Previous Medications    ATORVASTATIN (LIPITOR) 20 MG TABLET    Take 1 tablet by

## 2024-10-02 NOTE — ED TRIAGE NOTES
Patient in through ems from home with complaints of slurred speech noted when he woke up today at 0730, lkw 2330 on 10/1. Code stroke level 2 VAN- called from the field. . BP outside of defined limits at 160/s enroute, denies thinners.

## 2024-10-02 NOTE — PROGRESS NOTES
Neurocritical Care Code Stroke Documentation      Symptoms: Slurred speech   Baseline mRS:  0   Last Known Well: 1130 PM on 10/1/24   Medical hx: Past Medical History:   Diagnosis Date    Diabetes (HCC)     ED (erectile dysfunction) 2010    Hypertension     RA (rheumatoid arthritis) ([facility]) 2010      Vitals: Vitals:    10/02/24 1300   BP: (!) 161/92   Pulse: 78   Resp: 23   Temp: 97.7 °F (36.5 °C)   SpO2: 100%      Anticoagulation: None    VAN: Negative   NIHSS:   1a-LOC:0    1b-Month/Age:0    1c-Open/Close Hand:0    2-Best Gaze:0    3-Visual Fields:0    4-Facial Palsy:0    5a-Left Arm:0    5b-Right Arm:0    6a-Left Le    6b-Right Le    7-Limb Ataxia:0    8-Sensory:0    9-Best Language:0    10-Dysarthria:1    11-Extinction/Inattention:0  TOTAL SCORE:1   Imaging (personally reviewed): CT Head: No acute abnormality   CT Angio Head and Neck: No LVO   CT Perfusion: Negative    Plan: TNK Candidate: NO  Mechanical thrombectomy Candidate: NO    *Perform dysphagia screening prior to any PO intake*     Arrival time: 1210    MARISA Wagoner - CNP  Neurocritical Care Nurse Practitioner

## 2024-10-03 ENCOUNTER — TELEPHONE (OUTPATIENT)
Facility: HOSPITAL | Age: 64
End: 2024-10-03

## 2024-10-03 ENCOUNTER — APPOINTMENT (OUTPATIENT)
Facility: HOSPITAL | Age: 64
End: 2024-10-03
Attending: HOSPITALIST
Payer: COMMERCIAL

## 2024-10-03 LAB
ALBUMIN SERPL-MCNC: 3 G/DL (ref 3.5–5)
ALBUMIN/GLOB SERPL: 0.8 (ref 1.1–2.2)
ALP SERPL-CCNC: 94 U/L (ref 45–117)
ALT SERPL-CCNC: 17 U/L (ref 12–78)
ANION GAP SERPL CALC-SCNC: 6 MMOL/L (ref 2–12)
AST SERPL-CCNC: 12 U/L (ref 15–37)
BILIRUB SERPL-MCNC: 0.4 MG/DL (ref 0.2–1)
BUN SERPL-MCNC: 14 MG/DL (ref 6–20)
BUN/CREAT SERPL: 12 (ref 12–20)
CALCIUM SERPL-MCNC: 8.6 MG/DL (ref 8.5–10.1)
CHLORIDE SERPL-SCNC: 112 MMOL/L (ref 97–108)
CHOLEST SERPL-MCNC: 203 MG/DL
CO2 SERPL-SCNC: 22 MMOL/L (ref 21–32)
CREAT SERPL-MCNC: 1.17 MG/DL (ref 0.7–1.3)
ECHO AO ROOT DIAM: 3.1 CM
ECHO AO ROOT INDEX: 1.69 CM/M2
ECHO AV AREA PEAK VELOCITY: 3.4 CM2
ECHO AV AREA VTI: 2.8 CM2
ECHO AV AREA/BSA PEAK VELOCITY: 1.9 CM2/M2
ECHO AV AREA/BSA VTI: 1.5 CM2/M2
ECHO AV MEAN GRADIENT: 4 MMHG
ECHO AV MEAN VELOCITY: 0.9 M/S
ECHO AV PEAK GRADIENT: 6 MMHG
ECHO AV PEAK VELOCITY: 1.2 M/S
ECHO AV VELOCITY RATIO: 0.92
ECHO AV VTI: 21.1 CM
ECHO BSA: 1.85 M2
ECHO LA DIAMETER INDEX: 1.58 CM/M2
ECHO LA DIAMETER: 2.9 CM
ECHO LA TO AORTIC ROOT RATIO: 0.94
ECHO LA VOL A-L A2C: 28 ML (ref 18–58)
ECHO LA VOL A-L A4C: 16 ML (ref 18–58)
ECHO LA VOL MOD A2C: 28 ML (ref 18–58)
ECHO LA VOL MOD A4C: 16 ML (ref 18–58)
ECHO LA VOLUME AREA LENGTH: 23 ML
ECHO LA VOLUME INDEX A-L A2C: 15 ML/M2 (ref 16–34)
ECHO LA VOLUME INDEX A-L A4C: 9 ML/M2 (ref 16–34)
ECHO LA VOLUME INDEX AREA LENGTH: 13 ML/M2 (ref 16–34)
ECHO LA VOLUME INDEX MOD A2C: 15 ML/M2 (ref 16–34)
ECHO LA VOLUME INDEX MOD A4C: 9 ML/M2 (ref 16–34)
ECHO LV E' LATERAL VELOCITY: 7.6 CM/S
ECHO LV E' SEPTAL VELOCITY: 7.5 CM/S
ECHO LV EF PHYSICIAN: 55 %
ECHO LV EJECTION FRACTION A2C: 57 %
ECHO LV EJECTION FRACTION A4C: 35 %
ECHO LV FRACTIONAL SHORTENING: 38 % (ref 28–44)
ECHO LV INTERNAL DIMENSION DIASTOLE INDEX: 2.3 CM/M2
ECHO LV INTERNAL DIMENSION DIASTOLIC: 4.2 CM (ref 4.2–5.9)
ECHO LV INTERNAL DIMENSION SYSTOLIC INDEX: 1.42 CM/M2
ECHO LV INTERNAL DIMENSION SYSTOLIC: 2.6 CM
ECHO LV IVSD: 1.1 CM (ref 0.6–1)
ECHO LV MASS 2D: 167.4 G (ref 88–224)
ECHO LV MASS INDEX 2D: 91.5 G/M2 (ref 49–115)
ECHO LV POSTERIOR WALL DIASTOLIC: 1.2 CM (ref 0.6–1)
ECHO LV RELATIVE WALL THICKNESS RATIO: 0.57
ECHO LVOT AREA: 3.8 CM2
ECHO LVOT AV VTI INDEX: 0.74
ECHO LVOT DIAM: 2.2 CM
ECHO LVOT MEAN GRADIENT: 2 MMHG
ECHO LVOT PEAK GRADIENT: 5 MMHG
ECHO LVOT PEAK VELOCITY: 1.1 M/S
ECHO LVOT STROKE VOLUME INDEX: 32.4 ML/M2
ECHO LVOT SV: 59.3 ML
ECHO LVOT VTI: 15.6 CM
ECHO MV A VELOCITY: 0.74 M/S
ECHO MV AREA PHT: 3.7 CM2
ECHO MV E DECELERATION TIME (DT): 202.8 MS
ECHO MV E VELOCITY: 0.5 M/S
ECHO MV E/A RATIO: 0.68
ECHO MV E/E' LATERAL: 6.58
ECHO MV E/E' RATIO (AVERAGED): 6.62
ECHO MV E/E' SEPTAL: 6.67
ECHO MV PRESSURE HALF TIME (PHT): 58.8 MS
ECHO PV MAX VELOCITY: 1 M/S
ECHO PV PEAK GRADIENT: 4 MMHG
ECHO RV TAPSE: 2.1 CM (ref 1.7–?)
EKG ATRIAL RATE: 73 BPM
EKG DIAGNOSIS: NORMAL
EKG P AXIS: 63 DEGREES
EKG P-R INTERVAL: 168 MS
EKG Q-T INTERVAL: 420 MS
EKG QRS DURATION: 80 MS
EKG QTC CALCULATION (BAZETT): 462 MS
EKG R AXIS: 50 DEGREES
EKG T AXIS: 15 DEGREES
EKG VENTRICULAR RATE: 73 BPM
ERYTHROCYTE [DISTWIDTH] IN BLOOD BY AUTOMATED COUNT: 12.6 % (ref 11.5–14.5)
EST. AVERAGE GLUCOSE BLD GHB EST-MCNC: 263 MG/DL
EST. AVERAGE GLUCOSE BLD GHB EST-MCNC: 272 MG/DL
GLOBULIN SER CALC-MCNC: 3.6 G/DL (ref 2–4)
GLUCOSE BLD STRIP.AUTO-MCNC: 235 MG/DL (ref 65–117)
GLUCOSE BLD STRIP.AUTO-MCNC: 274 MG/DL (ref 65–117)
GLUCOSE BLD STRIP.AUTO-MCNC: 274 MG/DL (ref 65–117)
GLUCOSE BLD STRIP.AUTO-MCNC: 283 MG/DL (ref 65–117)
GLUCOSE BLD STRIP.AUTO-MCNC: 303 MG/DL (ref 65–117)
GLUCOSE SERPL-MCNC: 255 MG/DL (ref 65–100)
HBA1C MFR BLD: 10.8 % (ref 4–5.6)
HBA1C MFR BLD: 11.1 % (ref 4–5.6)
HCT VFR BLD AUTO: 41.2 % (ref 36.6–50.3)
HDLC SERPL-MCNC: 43 MG/DL
HDLC SERPL: 4.7 (ref 0–5)
HGB BLD-MCNC: 13.2 G/DL (ref 12.1–17)
LDLC SERPL CALC-MCNC: 132.4 MG/DL (ref 0–100)
MCH RBC QN AUTO: 27.8 PG (ref 26–34)
MCHC RBC AUTO-ENTMCNC: 32 G/DL (ref 30–36.5)
MCV RBC AUTO: 86.9 FL (ref 80–99)
NRBC # BLD: 0 K/UL (ref 0–0.01)
NRBC BLD-RTO: 0 PER 100 WBC
PLATELET # BLD AUTO: 181 K/UL (ref 150–400)
PMV BLD AUTO: 11.7 FL (ref 8.9–12.9)
POTASSIUM SERPL-SCNC: 4.1 MMOL/L (ref 3.5–5.1)
PROT SERPL-MCNC: 6.6 G/DL (ref 6.4–8.2)
RBC # BLD AUTO: 4.74 M/UL (ref 4.1–5.7)
SERVICE CMNT-IMP: ABNORMAL
SODIUM SERPL-SCNC: 140 MMOL/L (ref 136–145)
TRIGL SERPL-MCNC: 138 MG/DL
VLDLC SERPL CALC-MCNC: 27.6 MG/DL
WBC # BLD AUTO: 8 K/UL (ref 4.1–11.1)

## 2024-10-03 PROCEDURE — 6370000000 HC RX 637 (ALT 250 FOR IP): Performed by: HOSPITALIST

## 2024-10-03 PROCEDURE — 85027 COMPLETE CBC AUTOMATED: CPT

## 2024-10-03 PROCEDURE — 6370000000 HC RX 637 (ALT 250 FOR IP): Performed by: STUDENT IN AN ORGANIZED HEALTH CARE EDUCATION/TRAINING PROGRAM

## 2024-10-03 PROCEDURE — 82962 GLUCOSE BLOOD TEST: CPT

## 2024-10-03 PROCEDURE — 80053 COMPREHEN METABOLIC PANEL: CPT

## 2024-10-03 PROCEDURE — 36415 COLL VENOUS BLD VENIPUNCTURE: CPT

## 2024-10-03 PROCEDURE — 97165 OT EVAL LOW COMPLEX 30 MIN: CPT

## 2024-10-03 PROCEDURE — 4A03X5D MEASUREMENT OF ARTERIAL FLOW, INTRACRANIAL, EXTERNAL APPROACH: ICD-10-PCS | Performed by: HOSPITALIST

## 2024-10-03 PROCEDURE — 93306 TTE W/DOPPLER COMPLETE: CPT

## 2024-10-03 PROCEDURE — 2060000000 HC ICU INTERMEDIATE R&B

## 2024-10-03 PROCEDURE — 97530 THERAPEUTIC ACTIVITIES: CPT

## 2024-10-03 PROCEDURE — 2580000003 HC RX 258: Performed by: HOSPITALIST

## 2024-10-03 PROCEDURE — 92522 EVALUATE SPEECH PRODUCTION: CPT | Performed by: SPEECH-LANGUAGE PATHOLOGIST

## 2024-10-03 PROCEDURE — 80061 LIPID PANEL: CPT

## 2024-10-03 PROCEDURE — 6360000002 HC RX W HCPCS: Performed by: HOSPITALIST

## 2024-10-03 PROCEDURE — 92610 EVALUATE SWALLOWING FUNCTION: CPT | Performed by: SPEECH-LANGUAGE PATHOLOGIST

## 2024-10-03 PROCEDURE — 99223 1ST HOSP IP/OBS HIGH 75: CPT | Performed by: STUDENT IN AN ORGANIZED HEALTH CARE EDUCATION/TRAINING PROGRAM

## 2024-10-03 PROCEDURE — 97161 PT EVAL LOW COMPLEX 20 MIN: CPT

## 2024-10-03 PROCEDURE — 93005 ELECTROCARDIOGRAM TRACING: CPT | Performed by: HOSPITALIST

## 2024-10-03 PROCEDURE — 83036 HEMOGLOBIN GLYCOSYLATED A1C: CPT

## 2024-10-03 RX ORDER — CLOPIDOGREL BISULFATE 75 MG/1
75 TABLET ORAL DAILY
Status: DISCONTINUED | OUTPATIENT
Start: 2024-10-04 | End: 2024-10-04 | Stop reason: HOSPADM

## 2024-10-03 RX ORDER — INSULIN GLARGINE 100 [IU]/ML
0.25 INJECTION, SOLUTION SUBCUTANEOUS NIGHTLY
Status: DISCONTINUED | OUTPATIENT
Start: 2024-10-03 | End: 2024-10-04 | Stop reason: HOSPADM

## 2024-10-03 RX ORDER — CLOPIDOGREL BISULFATE 75 MG/1
300 TABLET ORAL ONCE
Status: COMPLETED | OUTPATIENT
Start: 2024-10-03 | End: 2024-10-03

## 2024-10-03 RX ADMIN — INSULIN LISPRO 4 UNITS: 100 INJECTION, SOLUTION INTRAVENOUS; SUBCUTANEOUS at 18:12

## 2024-10-03 RX ADMIN — ENOXAPARIN SODIUM 40 MG: 100 INJECTION SUBCUTANEOUS at 11:55

## 2024-10-03 RX ADMIN — Medication 10 ML: at 08:08

## 2024-10-03 RX ADMIN — ASPIRIN 81 MG CHEWABLE TABLET 81 MG: 81 TABLET CHEWABLE at 08:01

## 2024-10-03 RX ADMIN — CLOPIDOGREL BISULFATE 300 MG: 75 TABLET ORAL at 16:32

## 2024-10-03 RX ADMIN — ATORVASTATIN CALCIUM 80 MG: 40 TABLET, FILM COATED ORAL at 21:30

## 2024-10-03 RX ADMIN — INSULIN LISPRO 6 UNITS: 100 INJECTION, SOLUTION INTRAVENOUS; SUBCUTANEOUS at 08:01

## 2024-10-03 RX ADMIN — INSULIN LISPRO 4 UNITS: 100 INJECTION, SOLUTION INTRAVENOUS; SUBCUTANEOUS at 11:55

## 2024-10-03 RX ADMIN — Medication 10 ML: at 21:30

## 2024-10-03 RX ADMIN — INSULIN GLARGINE 18 UNITS: 100 INJECTION, SOLUTION SUBCUTANEOUS at 21:30

## 2024-10-03 NOTE — CONSULTS
Neurology Consult Note     NAME: Dante Hwang   :  1960   MRN:  [id number]   DATE:  10/3/2024    Assessment and Plan:     65 yo M h/o DM, HTN, RA presenting with slurred speech and discoordination, now essentially resolved. CT neuroimaging showed <50% R ICA stenosis and mild scattered atherosclerosis but no acute findings. MRI brain wo showed acute infarct in R BG. Does no take ASA, takes lipitor 20 but has not taken his meds in a month since he was feeling well. Exam shows possibly very subtle L nasolabial fold flattening but has facial asymmetry at baseline, otherwise within normal limits. Etiology of stroke lacunar.    - Start ASA 81 mg daily  - Give Plavix 300 mg x1 then start 75 mg daily x20 days THEN STOP PLAVIX  - Increase home atorvastatin to 80 mg daily  - TTE no thrombus  - A1c 10.8,   - No needs per PT/OT. May need outpatient SLP.  - Discussed 6 month driving restrictions  - Follow up in neurology clinic in 6-8 weeks    We will sign off at this time.    Subjective   HPI:  Dante Hwang is a 64 y.o. male who presents with Acute ischemic stroke (HCC). Neurology was consulted for CVA. History obtained per patient and chart review.    Mr. Hwang presents due to slurred speech and discoordination on waking. On 10/2, he noted slurring of his speech and also had difficulty \"staying in the kristi\" while driving to his doctors appointment. On arrival there, he was sent to the ED. Since then patient feels that he is 99% back to normal. On arrival to ED, CT neuroimaging showed <50% R ICA stenosis and mild scattered atherosclerosis but no acute findings. MRI brain wo showed acute infarct in R BG. Patient does not take ASA. Takes lipitor 20. Smoked 1-2 cigars per day up until a few days ago. Has not taken his medications in ~1 month.     ROS:  A

## 2024-10-03 NOTE — PLAN OF CARE
Problem: SLP Adult - Impaired Swallowing  Goal: By Discharge: Advance to least restrictive diet without signs or symptoms of aspiration for planned discharge setting.  See evaluation for individualized goals.  Description: Speech Pathology Goals Initiated 10/3/24    1.  Patient will tolerated regular diet, thin liquids free of s/s aspiration within 7 days.  2.  Patient will be use speech intelligibility strategies to be 90% intelligible at sentence, multi-syllable word and conversation level within 7 days.  10/3/2024 1034 by Lorna Carvalho, SLP  Outcome: Progressing     Problem: Discharge Planning  Goal: Discharge to home or other facility with appropriate resources  Outcome: Progressing  Flowsheets (Taken 10/3/2024 1600)  Discharge to home or other facility with appropriate resources: Identify barriers to discharge with patient and caregiver     Problem: Chronic Conditions and Co-morbidities  Goal: Patient's chronic conditions and co-morbidity symptoms are monitored and maintained or improved  Outcome: Progressing  Flowsheets (Taken 10/3/2024 1600)  Care Plan - Patient's Chronic Conditions and Co-Morbidity Symptoms are Monitored and Maintained or Improved:   Monitor and assess patient's chronic conditions and comorbid symptoms for stability, deterioration, or improvement   Collaborate with multidisciplinary team to address chronic and comorbid conditions and prevent exacerbation or deterioration   Update acute care plan with appropriate goals if chronic or comorbid symptoms are exacerbated and prevent overall improvement and discharge

## 2024-10-03 NOTE — PROGRESS NOTES
Progress note  Stan Rudd MD  Date of Service:  10/3/2024        History of Presenting Illness:   Dante Hwang is a 64 y.o. male with PMH significant for type 2 diabetes, hypertension, rheumatoid arthritis on weekly Enbrel injection presents to the ED for strokelike symptoms.  He started with slurring of speech when he woke up this morning.  Last known well around 11:30 PM last night.  He had an appointment with his rheumatologist so he drove himself from [place] to [place] but noticed he had difficulty keeping in the right kristi..  His rheumatologist directed him to come to the ED.  Code stroke evaluation was initiated in the ED.  Noncontrast head CT was negative.  CT perfusion showed small age-indeterminate infarct in the right anterior basal ganglia.  CTA negative for flow-limiting stenosis.  Patient quitted smoking a month ago.  He denied illicit drug use or alcohol.  No previous history of MI or stroke, CHF.  He does not take aspirin or other antiplatelets, anticoagulants.     Subjective/interval history  -Discussed MRI results with patient.  He has no complaints.  Discussed with neurologist, recommendations appreciated.      Assessment and plan   Given the patient's current clinical presentation, there is a high level of concern for decompensation if discharged from the emergency department. Complex decision making was performed, which includes reviewing the patient's available past medical records, laboratory results, and imaging studies.    Acute infarct to the right basal ganglia.  TTE without thrombus.  , A1c 10.8  --Continue dual antiplatelet with aspirin and Plavix for 21 days followed by low-dose aspirin indefinitely.  --Continue Lipitor.  -- 6-month driving restriction per neuro    KALPANA, mild hypokalemia  -- Resolved.  Continue to hold lisinopril and metformin for now    Type II DM with hyperglycemia, on oral hypoglycemics.  Uncontrolled with A1c of 10.8 diabetic  Abnormal; Notable for the following components:    Hemoglobin A1C 10.8 (*)     All other components within normal limits   LIPID PANEL - Abnormal; Notable for the following components:    Cholesterol, Total 203 (*)     LDL Cholesterol 132.4 (*)     All other components within normal limits   HEMOGLOBIN A1C - Abnormal; Notable for the following components:    Hemoglobin A1C 11.1 (*)     All other components within normal limits   COMPREHENSIVE METABOLIC PANEL - Abnormal; Notable for the following components:    Chloride 112 (*)     Glucose 255 (*)     AST 12 (*)     Albumin 3.0 (*)     Albumin/Globulin Ratio 0.8 (*)     All other components within normal limits   POCT GLUCOSE - Abnormal; Notable for the following components:    POC Glucose 183 (*)     All other components within normal limits   POCT GLUCOSE - Abnormal; Notable for the following components:    POC Glucose 308 (*)     All other components within normal limits   POCT GLUCOSE - Abnormal; Notable for the following components:    POC Glucose 274 (*)     All other components within normal limits   POCT GLUCOSE - Abnormal; Notable for the following components:    POC Glucose 303 (*)     All other components within normal limits   POCT GLUCOSE - Abnormal; Notable for the following components:    POC Glucose 283 (*)     All other components within normal limits   POCT GLUCOSE - Abnormal; Notable for the following components:    POC Glucose 274 (*)     All other components within normal limits       [unfilled]    IMAGING:   MRI brain without contrast   Final Result      1. Small acute infarct in the right basal ganglia.   2. Mild chronic microvascular ischemic disease.         Electronically signed by Rickey Lopez      CTA HEAD NECK W CONTRAST   Final Result   CTA Head:   1. No evidence of flow-limiting stenosis or aneurysm. Mild scattered   atherosclerotic disease as above.   2. Small age-indeterminate infarct in the right anterior basal ganglia.

## 2024-10-03 NOTE — TELEPHONE ENCOUNTER
Pt needs a hospital follow up appointment  Provider: any  Location: any  In person or virtual: either  When: 6-8 weeks  Diagnosis/reason for follow up:  stroke  Additional information: none

## 2024-10-03 NOTE — ED NOTES
TRANSFER - OUT REPORT:    Verbal report given to RENETTA Torres on Dante Hwang  being transferred to 6S for routine progression of patient care       Report consisted of patient's Situation, Background, Assessment and   Recommendations(SBAR).     Information from the following report(s) Nurse Handoff Report, Index, ED Encounter Summary, ED SBAR, Adult Overview, Intake/Output, MAR, and Recent Results was reviewed with the receiving nurse.    Holman Fall Assessment:    Presents to emergency department  because of falls (Syncope, seizure, or loss of consciousness): No  Age > 70: No  Altered Mental Status, Intoxication with alcohol or substance confusion (Disorientation, impaired judgment, poor safety awaremess, or inability to follow instructions): Yes  Impaired Mobility: Ambulates or transfers with assistive devices or assistance; Unable to ambulate or transer.: No  Nursing Judgement: Yes          Lines:   Peripheral IV 10/02/24 Left Antecubital (Active)        Opportunity for questions and clarification was provided.      Patient transported with:  Monitor and Registered Nurse

## 2024-10-03 NOTE — PROGRESS NOTES
PHYSICAL THERAPY EVALUATION/DISCHARGE    Patient: Dante Hwang (64 y.o. male)  Date: 10/3/2024  Primary Diagnosis: Acute ischemic stroke ([facility]) [I63.9]       Precautions:                        ASSESSMENT AND RECOMMENDATIONS:  Based on the objective data below, the patient presents with resolution of symptoms s/p admission for CVA work up.  MRI + small acute infarct in the right basal ganglia. Received pt in the ER sitting EOB w/ his wife in the room.  Wife and pt report pt's speech is normal.  Patient demonstrates normal strength, ROM, and coordination.  He is independent with w/ transfers and ambulation w/o DME w/ steady gait and completed dynamic standing tasks w/o LOB.  Completed education re: BEFAST, fall prevention and driving restriction.  Additional acute therapy is not indicated at this time.  Pt and wife in agreement. Will sign off. Please re-consult if needs arise. Thank you.     Functional Outcome Measure:  The patient scored 56/56 on the [facility] outcome measure which is indicative of low fall risk.      Further skilled acute physical therapy is not indicated at this time.       PLAN :  Recommendation for discharge: (in order for the patient to meet his/her long term goals):   No skilled physical therapy    Other factors to consider for discharge: no additional factors    IF patient discharges home will need the following DME: none       SUBJECTIVE:       OBJECTIVE DATA SUMMARY:     Past Medical History:   Diagnosis Date    Diabetes ([facility])     ED (erectile dysfunction) 5/25/2010    Hypertension     RA (rheumatoid arthritis) ([facility]) 5/25/2010   No past surgical history on file.    Home Situation and Prior Level of Function:   Social/Functional History  Lives With: Spouse  Type of Home: House  Home Layout: One level  Home Access: Stairs to enter with rails  Entrance Stairs - Number of Steps: 4  Entrance Stairs - Rails: Both  Bathroom Shower/Tub: Walk-in shower  Bathroom Toilet: Handicap height  Bathroom

## 2024-10-03 NOTE — PLAN OF CARE
Speech LAnguage Pathology EVALUATION    Patient: Dante Hwang (64 y.o. male)  Date: 10/3/2024  Primary Diagnosis: Acute ischemic stroke (HCC) [I63.9]       Precautions: Aspiration                    ASSESSMENT :  Patient presents with mild oral dysphagia characterized by mild oral motor weakness.  Dysphagia resulted in mildly prolonged mastication and oral transit with solids with trace oral residue.  Patient tolerated thin liquids in single cup sips, straw sips and successive straw sips, purees and solids without overt s/s aspiration.  Given tolerance of diet to date and bedside presentation feel patient is safe to continue regular diet with precautions listed below.  Risk of aspiration includes recent onset basal ganglia CVA.  Patient presents with mild dysarthria characterized by reduced breath support and imprecise speech with blended word boundaries.  Dysarthria results in low speaking volume and reduced speech intelligibility.  SLP to follow for speech treatment as well as dysphagia treatment.    Patient will benefit from skilled intervention to address the above impairments.     PLAN :  Recommendations and Planned Interventions:  Diet: Regular and thin liquids  Sit up for meals  Small bites  Single sips  Medication as tolerated  Hold PO if coughing/choking, change in status or decline in respiratory function noted  Encourage slow, loud and clear speech       Acute SLP Services: Yes, patient will be followed by speech-language pathology 3x/week to address goals. Patient's rehabilitation potential is considered to be Good.    Discharge Recommendations: Continue to assess pending progress     SUBJECTIVE:   Patient stated, “That's fine.”  Patient reports no difficulty swallowing.  Diet initiated 10/2/24.  Patient reports speech intelligibility improved this morning.  \"CT perfusion showed small age-indeterminate infarcts in the right posterior basal ganglia\"     OBJECTIVE:     Past Medical History:

## 2024-10-03 NOTE — CARE COORDINATION
Care Management Initial Assessment       RUR: 12% Low   Readmission? No  1st IM letter given? No n/a   1st  letter given: No n/a        10/03/24 1121   Service Assessment   Patient Orientation Alert and Oriented   Cognition Alert   History Provided By Patient   Primary Caregiver Self   Accompanied By/Relationship n/a   Support Systems Spouse/Significant Other;Family Members   Patient's Healthcare Decision Maker is: Legal Next of Kin   PCP Verified by CM Yes   Last Visit to PCP Within last 6 months   Prior Functional Level Independent in ADLs/IADLs   Current Functional Level Independent in ADLs/IADLs   Can patient return to prior living arrangement Yes   Ability to make needs known: Good   Family able to assist with home care needs: Yes   Would you like for me to discuss the discharge plan with any other family members/significant others, and if so, who? No   Social/Functional History   Lives With Spouse   Type of Home House   Home Layout One level   Home Access Stairs to enter with rails   Entrance Stairs - Number of Steps 4   Entrance Stairs - Rails Both   Bathroom Shower/Tub Walk-in shower   Bathroom Toilet Handicap height   Bathroom Equipment Grab bars in shower   Active  Yes   Discharge Planning   Patient expects to be discharged to: House     CM met with pt to introduce self, role, and to conduct initial assessment. Facesheet demographics verified.     Residence: [address] [place] [address]   ADL: Independent   DME: None   Pharmacy: [facility] [address]   PCP: Alysa TINAJERO last saw within 6 months   Verified Insurance: Yes   Emergency Contacts: Wife Leticia Hwang [phone]   Previous rehab services: None   Transportation: [place] to transport at [place]     Pt reported he [facility] but is not currently receiving any care or care coordinator through [facility].   No CM needs identified. CM available if needs arise.     KIKI Parham    [facility]    or by Perfect [employer]

## 2024-10-03 NOTE — PROGRESS NOTES
Independent (handwashing post-toileting; standing oral hygiene)    Intact dynamic standing balance during ADL item retrieval from various planes.     Intervention/Education specific to: \"Stroke diagnoses\"    Patient was educated regarding his deficit(s).    Patient and/or family was verbally and visually educated on the BE FAST acronym for signs/symptoms of CVA and TIA.  All questions answered with patient indicating good  understanding.     Fugl-William Assessment of Motor Recovery after Stroke:     Reflex Activity  Flexors/Biceps/Fingers: Can be elicited  Extensors/Triceps: Can be elicited  Reflex Subtotal: 4    Volitional Movement Within Synergies  Shoulder Retraction: Full  Shoulder Elevation: Full  Shoulder Abduction (90 degrees): Full  Shoulder External Rotation: Full  Elbow Flexion: Full  Forearm Supination: Full  Shoulder Adduction/Internal Rotation: Full  Elbow Extension: Full  Forearm Pronation: Full  Subtotal: 18    Volitional Movement Mixing Synergies  Hand to Lumbar Spine: Full  Shoulder Flexion (0-90 degrees): Full  Pronation-Supination: Full  Subtotal: 6    Volitional Movement With Little or No Synergy  Shoulder Abduction (0-90 degrees): Full  Shoulder Flexion ( degrees): Full  Pronation/Supination: Full  Subtotal: 6    Normal Reflex Activity  Biceps, Triceps, Finger Flexors: Full  Subtotal: 2    Upper Extremity Total   Upper Extremity Total: 36    Wrist  Stability at 15 Degree Dorsiflexion: Full  Repeated Dorsiflexion/ Volar Flexion: Full  Stability at 15 Degree Dorsiflexion: Full  Repeated Dorsiflexion/ Volar Flexion: Full  Circumduction: Full  Wrist Total: 10    Hand  Mass Flexion: Full  Mass Extension: Full  Grasp A: Full  Grasp B: Full  Grasp C: Full  Grasp D: Full  Grasp E: Full  Hand Total: 14    Coordination/Speed  Tremor: None  Dysmetria: None  Time: <1s (4s)  Coordination/Speed Total: 6    Total A-D  Total A-D (Motor Function): 66         This is a reliable/valid measure of arm function  Verbal;Demonstration  Barriers to Learning: None  Education Outcome: Verbalized understanding    Thank you for this referral.  Kenzie Osei OT  Minutes: 24    Occupational Therapy Evaluation Charge Determination   History Examination Decision-Making   LOW Complexity : Brief history review  LOW Complexity: 1-3 Performance deficits relating to physical, cognitive, or psychosocial skills that result in activity limitations and/or participation restrictions LOW Complexity: No comorbidities that affect functional and  no verbal  or physical assist needed to complete eval tasks   Based on the above components, the patient evaluation is determined to be of the following complexity level: Low

## 2024-10-03 NOTE — ED NOTES
Pt had episode of significant diaphoresis and bradycardia to 40's. Provider Rocio VILLANUEVA called and orders carried out. Pt has now returned back to baseline.

## 2024-10-04 VITALS
RESPIRATION RATE: 16 BRPM | HEIGHT: 66 IN | OXYGEN SATURATION: 98 % | BODY MASS INDEX: 26.11 KG/M2 | TEMPERATURE: 98.1 F | DIASTOLIC BLOOD PRESSURE: 75 MMHG | WEIGHT: 162.48 LBS | HEART RATE: 62 BPM | SYSTOLIC BLOOD PRESSURE: 156 MMHG

## 2024-10-04 LAB
ALBUMIN SERPL-MCNC: 3 G/DL (ref 3.5–5)
ALBUMIN/GLOB SERPL: 0.9 (ref 1.1–2.2)
ALP SERPL-CCNC: 99 U/L (ref 45–117)
ALT SERPL-CCNC: 11 U/L (ref 12–78)
ANION GAP SERPL CALC-SCNC: 2 MMOL/L (ref 2–12)
AST SERPL-CCNC: 5 U/L (ref 15–37)
BILIRUB SERPL-MCNC: 0.5 MG/DL (ref 0.2–1)
BUN SERPL-MCNC: 14 MG/DL (ref 6–20)
BUN/CREAT SERPL: 12 (ref 12–20)
CALCIUM SERPL-MCNC: 8.9 MG/DL (ref 8.5–10.1)
CHLORIDE SERPL-SCNC: 110 MMOL/L (ref 97–108)
CO2 SERPL-SCNC: 28 MMOL/L (ref 21–32)
COMMENT:: NORMAL
CREAT SERPL-MCNC: 1.16 MG/DL (ref 0.7–1.3)
GLOBULIN SER CALC-MCNC: 3.2 G/DL (ref 2–4)
GLUCOSE BLD STRIP.AUTO-MCNC: 256 MG/DL (ref 65–117)
GLUCOSE SERPL-MCNC: 239 MG/DL (ref 65–100)
POTASSIUM SERPL-SCNC: 4 MMOL/L (ref 3.5–5.1)
PROT SERPL-MCNC: 6.2 G/DL (ref 6.4–8.2)
SERVICE CMNT-IMP: ABNORMAL
SODIUM SERPL-SCNC: 140 MMOL/L (ref 136–145)
SPECIMEN HOLD: NORMAL

## 2024-10-04 PROCEDURE — 92507 TX SP LANG VOICE COMM INDIV: CPT

## 2024-10-04 PROCEDURE — 6370000000 HC RX 637 (ALT 250 FOR IP): Performed by: HOSPITALIST

## 2024-10-04 PROCEDURE — 6370000000 HC RX 637 (ALT 250 FOR IP): Performed by: STUDENT IN AN ORGANIZED HEALTH CARE EDUCATION/TRAINING PROGRAM

## 2024-10-04 PROCEDURE — 92526 ORAL FUNCTION THERAPY: CPT

## 2024-10-04 PROCEDURE — 82962 GLUCOSE BLOOD TEST: CPT

## 2024-10-04 PROCEDURE — 6360000002 HC RX W HCPCS: Performed by: HOSPITALIST

## 2024-10-04 PROCEDURE — 2580000003 HC RX 258: Performed by: HOSPITALIST

## 2024-10-04 RX ORDER — ATORVASTATIN CALCIUM 20 MG/1
80 TABLET, FILM COATED ORAL DAILY
Qty: 30 TABLET | Refills: 3 | Status: SHIPPED | OUTPATIENT
Start: 2024-10-04

## 2024-10-04 RX ORDER — ASPIRIN 81 MG/1
81 TABLET, CHEWABLE ORAL DAILY
Qty: 30 TABLET | Refills: 3 | Status: SHIPPED | OUTPATIENT
Start: 2024-10-05

## 2024-10-04 RX ORDER — CLOPIDOGREL BISULFATE 75 MG/1
75 TABLET ORAL DAILY
Qty: 19 TABLET | Refills: 0 | Status: SHIPPED | OUTPATIENT
Start: 2024-10-05 | End: 2024-10-24

## 2024-10-04 RX ADMIN — INSULIN LISPRO 4 UNITS: 100 INJECTION, SOLUTION INTRAVENOUS; SUBCUTANEOUS at 09:03

## 2024-10-04 RX ADMIN — CLOPIDOGREL BISULFATE 75 MG: 75 TABLET ORAL at 08:52

## 2024-10-04 RX ADMIN — Medication 10 ML: at 08:52

## 2024-10-04 RX ADMIN — ASPIRIN 81 MG CHEWABLE TABLET 81 MG: 81 TABLET CHEWABLE at 08:51

## 2024-10-04 RX ADMIN — SODIUM CHLORIDE: 9 INJECTION, SOLUTION INTRAVENOUS at 01:54

## 2024-10-04 RX ADMIN — ENOXAPARIN SODIUM 40 MG: 100 INJECTION SUBCUTANEOUS at 08:52

## 2024-10-04 NOTE — DISCHARGE SUMMARY
Discharge Summary       PATIENT ID: Dante Hwang  MRN: [id number]   YOB: 1960    DATE OF ADMISSION: 10/2/2024 12:22 PM    DATE OF DISCHARGE: 10/04/24     PRIMARY CARE PROVIDER: Alysa Herring FNP     ATTENDING PHYSICIAN: Stan Rudd MD    DISCHARGING PROVIDER: Stan Rudd MD    To contact this individual call [phone] and ask the  to page.  If unavailable ask to be transferred the Adult Hospitalist Department.    CONSULTATIONS: IP CONSULT TO NEUROLOGY    PROCEDURES/SURGERIES: * No surgery found *     ADMITTING DIAGNOSES & HOSPITAL COURSE:   Dante Hwang is a 64 y.o. male with PMH significant for type 2 diabetes, hypertension, rheumatoid arthritis on weekly Enbrel injection presents to the ED for strokelike symptoms.  He started with slurring of speech when he woke up this morning.  Last known well around 11:30 PM last night.  He had an appointment with his rheumatologist so he drove himself from [place] to [place] but noticed he had difficulty keeping in the right kristi..  His rheumatologist directed him to come to the ED.  Code stroke evaluation was initiated in the ED.  Noncontrast head CT was negative.  CT perfusion showed small age-indeterminate infarct in the right anterior basal ganglia.  CTA negative for flow-limiting stenosis.  Patient quitted smoking a month ago.  He denied illicit drug use or alcohol.  No previous history of MI or stroke, CHF.  He does not take aspirin or other antiplatelets, anticoagulants.        DISCHARGE DIAGNOSES / PLAN:    Acute infarct to the right basal ganglia.  TTE without thrombus.  , A1c 10.8  --Continue dual antiplatelet with aspirin and Plavix for 21 days followed by low-dose aspirin indefinitely.  -- 100 high intensity Lipitor 80 mg daily.  PT and OT evaluated, no therapy needs.  Patient will need outpatient speech therapy.  -- Discussed with patient and his wife 6-month driving restriction per neuro       KALPANA,  mild hypokalemia  -- Resolved.  Continue to hold lisinopril and metformin for now     Type II DM with hyperglycemia, on oral hypoglycemics.  Uncontrolled with A1c of 10.8 diabetic neuropathy.  -- Patient on oral hypoglycemics.  Discussed about insulin with him and his wife for few months until his diabetes is controlled.  He would like to continue with oral medication and will discuss with his PCP regarding adding insulin.  -- Continue gabapentin.     RA: Patient is on weekly Enbrel injection.      PENDING TEST RESULTS:   At the time of discharge the following test results are still pending: None    FOLLOW UP APPOINTMENTS:    Follow-up Information       Follow up With Specialties Details Why Contact Stalin Gonzalez II RN  Call in 30 day(s) Thank you for choosing [facility] for your medical care! As part of our commitment to your health and well-being, the nurse navigator will follow up with you after your discharge to ensure you have everything you need. If you have any questions or concerns in the meantime, please don’t hesitate to reach out by phone at [phone] or by email at [email or web address]. We appreciate the opportunity to support you during your recovery! [address] [place] [address], [address]  [facility] [address] [phone]                ADDITIONAL CARE RECOMMENDATIONS:     DIET: regular diet    ACTIVITY: activity as tolerated          DISCHARGE MEDICATIONS:     Medication List        START taking these medications      aspirin 81 MG chewable tablet  Take 1 tablet by mouth daily  Start taking on: October 5, 2024     clopidogrel 75 MG tablet  Commonly known as: PLAVIX  Take 1 tablet by mouth daily for 19 doses  Start taking on: October 5, 2024            CHANGE how you take these medications      atorvastatin 20 MG tablet  Commonly known as: LIPITOR  Take 4 tablets by mouth daily  What changed: how much to take            CONTINUE taking these medications      Enbrel SureClick 50

## 2024-10-04 NOTE — PLAN OF CARE
Problem: Discharge Planning  Goal: Discharge to home or other facility with appropriate resources  Outcome: Adequate for Discharge  Flowsheets  Taken 10/4/2024 0800 by Karen Myers  Discharge to home or other facility with appropriate resources: Identify barriers to discharge with patient and caregiver  Taken 10/3/2024 2004 by Fe Quiros RN  Discharge to home or other facility with appropriate resources:   Identify barriers to discharge with patient and caregiver   Arrange for needed discharge resources and transportation as appropriate     Problem: Chronic Conditions and Co-morbidities  Goal: Patient's chronic conditions and co-morbidity symptoms are monitored and maintained or improved  Outcome: Adequate for Discharge  Flowsheets  Taken 10/4/2024 0800 by Karen Myers  Care Plan - Patient's Chronic Conditions and Co-Morbidity Symptoms are Monitored and Maintained or Improved: Monitor and assess patient's chronic conditions and comorbid symptoms for stability, deterioration, or improvement  Taken 10/3/2024 2004 by Fe Quiros RN  Care Plan - Patient's Chronic Conditions and Co-Morbidity Symptoms are Monitored and Maintained or Improved:   Monitor and assess patient's chronic conditions and comorbid symptoms for stability, deterioration, or improvement   Collaborate with multidisciplinary team to address chronic and comorbid conditions and prevent exacerbation or deterioration     Problem: Safety - Adult  Goal: Free from fall injury  Outcome: Adequate for Discharge  Flowsheets (Taken 10/3/2024 2006 by Fe Quiros RN)  Free From Fall Injury:   Instruct family/caregiver on patient safety   Based on caregiver fall risk screen, instruct family/caregiver to ask for assistance with transferring infant if caregiver noted to have fall risk factors

## 2024-10-04 NOTE — CARE COORDINATION
Transition of Care Plan:    Home with family    Transport: Roundtrip to [facility] [place] ED where car is located, setup for 11:45am    RUR:   9%  Prior Level of Functioning:  independent   Disposition: home   Follow up appointments: PCP   DME needed: none  Transportation at discharge: roundtrip   Caregiver Contact:   Leticia Hwang (Spouse)  [phone] (Mobile)  Discharge Caregiver contacted prior to discharge? no  Care Conference needed? no  Barriers to discharge: none    Per MD, pt stable for dc 10/4. Pt needing a ride to where car is located at [facility] ED. CM to setup roundtrip when pt dc orders are in.     CM department paying for pt roundtrip around $17. Pt unable to pay for transport.     Kimberley Mosher, BSW

## 2024-10-04 NOTE — DISCHARGE INSTRUCTIONS
Discharge Instructions       PATIENT ID: Dante Hwang  MRN: [id number]   YOB: 1960    DATE OF ADMISSION: 10/2/2024   DATE OF DISCHARGE: 10/4/2024    PRIMARY CARE PROVIDER: Alysa Herring     ATTENDING PHYSICIAN: Stan Rudd MD   DISCHARGING PROVIDER: Stan Rudd MD    To contact this individual call [phone] and ask the  to page.   If unavailable ask to be transferred the Adult Hospitalist Department.    DISCHARGE DIAGNOSES   Acute ischemic stroke.    -You are prescribed the following new medications: Aspirin 81 mg continue to take this indefinitely, clopidogrel (Plavix )75 mg for a total of 21 days then stop.  Increase the atorvastatin (Lipitor) to 80 mg daily.    What are the BE FAST stroke warning signs?  BE FAST is a simple way to remember the main symptoms of stroke. These symptoms happen suddenly. So knowing what to look for helps you know when to call for medical help.  BE FAST stands for:  B alance. Loss of balance or trouble walking.  E yes. Trouble seeing out of one or both eyes.  F ace. Weakness or drooping on one side of the face.  A rm. Weakness or numbness in an arm or leg.  S peech. Trouble speaking.  T malissa to call 911.    Please review the attached educational materials for additional care instruction.  A 6-month driving restriction per neurology      Your diabetes is not well-controlled as reflected by A1c of 10.8.  Take your medications as prescribed, increase the metformin dose to 2000 mg twice daily.  Check your blood glucose before each meal and at bedtime and take record to your appointment.  Discussed with your primary doctor about insulin use at least for a few months until your blood sugar control is improved.        CONSULTATIONS: You were seen and evaluated by neurologist  PROCEDURES/SURGERIES: * No surgery found *    PENDING TEST RESULTS:   At the time of discharge the following test results are still pending:     FOLLOW UP

## 2024-10-04 NOTE — PLAN OF CARE
Speech LAnguage Pathology TREATMENT/DISCHARGE    Patient: Dante Hwang (64 y.o. male)  Date: 10/4/2024  Primary Diagnosis: Acute ischemic stroke ([facility]) [I63.9]    Precautions: NA    ASSESSMENT:  Patient presents with functional oropharyngeal swallowing and motor speech skills. No immediate overt s/s of aspiration with thin liquids or solids. Speech intelligibility intact at conversation level. Do not feel patient will require OP SLP.     Patient will be discharged from skilled speech-language pathology services at this time.     PLAN :  Recommendations and Planned Interventions:  Diet: Regular and thin liquids  Oral medications whole with liquids  Routine oral care    Acute SLP Services: No, patient will be discharged from acute skilled speech-language pathology at this time.  Discharge Recommendations: No, additional SLP treatment not indicated at discharge     SUBJECTIVE:   Patient stated, “I am doing a lot better.”    OBJECTIVE:     Past Medical History:   Diagnosis Date    Diabetes ([facility])     ED (erectile dysfunction) 5/25/2010    Hypertension     RA (rheumatoid arthritis) ([facility]) 5/25/2010   No past surgical history on file.    Prior Level of Function/Home Situation:   Current Diet: Regular texture with thin liquids    Cognitive and Communication Status:  Neurologic State: Alert  Orientation Level: Oriented x4  Cognition: Appropriate decision making, Appropriate for age attention/concentration, Appropriate safety awareness, and Follows commands    Dysphagia:  P.O. Trials:  Assessment Method: Observation  Patient Position: Upright in bed  Consistency Presented: Thin liquids, Solids  How Presented: Self-fed/presented via straw  Bolus Acceptance: No impairment  Bolus Formation/Control: No impairment  Propulsion: No impairment  Oral Residue: None  Swallow Initiation: Present  Laryngeal Elevation: Present  Aspiration Signs/Symptoms: None  Pharyngeal Impairments: None    Motor Speech:  Intelligibility: Intact    Vocal Quality: WFL  Vocal Intensity: WFL   Articulation: WFL   Rate: WFL   Resonance: WFL   Prosody: WFL   Breath support: Adequate for speech    Respiratory Status/Airway:  Room air                         Outcome Measure:  Functional Oral Intake Scale (FOIS): 7--Total oral diet with no restrictions    After treatment:   Patient left in no apparent distress in bed, Call bell left within reach, and Caregiver present    COMMUNICATION/EDUCATION:   The patient's plan of care including recommendations, planned interventions were discussed with: patient and wife.    Patient/family have participated as able in goal setting and plan of care    Thank you,  Merna Rivas, SLP    SLP Individual Minutes  Time In: 0940  Time Out: 0955  Minutes: 15      Problem: SLP Adult - Impaired Swallowing  Goal: By Discharge: Advance to least restrictive diet without signs or symptoms of aspiration for planned discharge setting.  See evaluation for individualized goals.  Description: Speech Pathology Goals Initiated 10/3/24  1.  Patient will tolerated regular diet, thin liquids free of s/s aspiration within 7 days. Goal met 10/4/24.  2.  Patient will be use speech intelligibility strategies to be 90% intelligible at sentence, multi-syllable word and conversation level within 7 days. Goal met 10/4/24.   Outcome: Completed

## 2024-10-06 ENCOUNTER — HOSPITAL ENCOUNTER (EMERGENCY)
Facility: HOSPITAL | Age: 64
Discharge: HOME | End: 2024-10-06
Attending: EMERGENCY MEDICINE
Payer: OTHER GOVERNMENT

## 2024-10-06 VITALS
HEIGHT: 66 IN | HEART RATE: 72 BPM | DIASTOLIC BLOOD PRESSURE: 93 MMHG | OXYGEN SATURATION: 98 % | TEMPERATURE: 97 F | BODY MASS INDEX: 25.55 KG/M2 | SYSTOLIC BLOOD PRESSURE: 147 MMHG | RESPIRATION RATE: 13 BRPM | WEIGHT: 159 LBS

## 2024-10-06 DIAGNOSIS — R11.2 NAUSEA VOMITING AND DIARRHEA: ICD-10-CM

## 2024-10-06 DIAGNOSIS — E86.0 DEHYDRATION: Primary | ICD-10-CM

## 2024-10-06 DIAGNOSIS — R19.7 NAUSEA VOMITING AND DIARRHEA: ICD-10-CM

## 2024-10-06 DIAGNOSIS — N17.9 AKI (ACUTE KIDNEY INJURY) (HCC): ICD-10-CM

## 2024-10-06 LAB
ALBUMIN SERPL-MCNC: 3.5 G/DL (ref 3.5–5)
ALBUMIN/GLOB SERPL: 0.8 (ref 1.1–2.2)
ALP SERPL-CCNC: 108 U/L (ref 45–117)
ALT SERPL-CCNC: 19 U/L (ref 12–78)
ANION GAP SERPL CALC-SCNC: 12 MMOL/L (ref 2–12)
AST SERPL W P-5'-P-CCNC: 20 U/L (ref 15–37)
BASOPHILS # BLD: 0 K/UL (ref 0–0.1)
BASOPHILS NFR BLD: 0 % (ref 0–1)
BILIRUB SERPL-MCNC: 0.6 MG/DL (ref 0.2–1)
BUN SERPL-MCNC: 18 MG/DL (ref 6–20)
BUN/CREAT SERPL: 13 (ref 12–20)
CA-I BLD-MCNC: 9.2 MG/DL (ref 8.5–10.1)
CHLORIDE SERPL-SCNC: 107 MMOL/L (ref 97–108)
CO2 SERPL-SCNC: 21 MMOL/L (ref 21–32)
CREAT SERPL-MCNC: 1.39 MG/DL (ref 0.7–1.3)
DIFFERENTIAL METHOD BLD: NORMAL
EOSINOPHIL # BLD: 0.1 K/UL (ref 0–0.4)
EOSINOPHIL NFR BLD: 2 % (ref 0–7)
ERYTHROCYTE [DISTWIDTH] IN BLOOD BY AUTOMATED COUNT: 12.6 % (ref 11.5–14.5)
GLOBULIN SER CALC-MCNC: 4.4 G/DL (ref 2–4)
GLUCOSE BLD STRIP.AUTO-MCNC: 243 MG/DL (ref 65–100)
GLUCOSE SERPL-MCNC: 225 MG/DL (ref 65–100)
HCT VFR BLD AUTO: 41.6 % (ref 36.6–50.3)
HGB BLD-MCNC: 13.4 G/DL (ref 12.1–17)
IMM GRANULOCYTES # BLD AUTO: 0 K/UL (ref 0–0.04)
IMM GRANULOCYTES NFR BLD AUTO: 0 % (ref 0–0.5)
LYMPHOCYTES # BLD: 2.1 K/UL (ref 0.8–3.5)
LYMPHOCYTES NFR BLD: 29 % (ref 12–49)
MCH RBC QN AUTO: 28 PG (ref 26–34)
MCHC RBC AUTO-ENTMCNC: 32.2 G/DL (ref 30–36.5)
MCV RBC AUTO: 86.8 FL (ref 80–99)
MONOCYTES # BLD: 0.6 K/UL (ref 0–1)
MONOCYTES NFR BLD: 9 % (ref 5–13)
NEUTS SEG # BLD: 4.2 K/UL (ref 1.8–8)
NEUTS SEG NFR BLD: 60 % (ref 32–75)
NRBC # BLD: 0 K/UL (ref 0–0.01)
NRBC BLD-RTO: 0 PER 100 WBC
PERFORMED BY:: ABNORMAL
PLATELET # BLD AUTO: 193 K/UL (ref 150–400)
PMV BLD AUTO: 11.3 FL (ref 8.9–12.9)
POTASSIUM SERPL-SCNC: 4.3 MMOL/L (ref 3.5–5.1)
PROT SERPL-MCNC: 7.9 G/DL (ref 6.4–8.2)
RBC # BLD AUTO: 4.79 M/UL (ref 4.1–5.7)
SODIUM SERPL-SCNC: 140 MMOL/L (ref 136–145)
TROPONIN I SERPL HS-MCNC: 5 NG/L (ref 0–76)
WBC # BLD AUTO: 7 K/UL (ref 4.1–11.1)

## 2024-10-06 PROCEDURE — 2580000003 HC RX 258: Performed by: EMERGENCY MEDICINE

## 2024-10-06 PROCEDURE — 84484 ASSAY OF TROPONIN QUANT: CPT

## 2024-10-06 PROCEDURE — 82962 GLUCOSE BLOOD TEST: CPT

## 2024-10-06 PROCEDURE — 6370000000 HC RX 637 (ALT 250 FOR IP): Performed by: EMERGENCY MEDICINE

## 2024-10-06 PROCEDURE — 6360000002 HC RX W HCPCS: Performed by: EMERGENCY MEDICINE

## 2024-10-06 PROCEDURE — 85025 COMPLETE CBC W/AUTO DIFF WBC: CPT

## 2024-10-06 PROCEDURE — 96374 THER/PROPH/DIAG INJ IV PUSH: CPT

## 2024-10-06 PROCEDURE — 93005 ELECTROCARDIOGRAM TRACING: CPT | Performed by: EMERGENCY MEDICINE

## 2024-10-06 PROCEDURE — 96361 HYDRATE IV INFUSION ADD-ON: CPT

## 2024-10-06 PROCEDURE — 80053 COMPREHEN METABOLIC PANEL: CPT

## 2024-10-06 PROCEDURE — 99284 EMERGENCY DEPT VISIT MOD MDM: CPT

## 2024-10-06 PROCEDURE — 36415 COLL VENOUS BLD VENIPUNCTURE: CPT

## 2024-10-06 RX ORDER — LOPERAMIDE HCL 2 MG
2 CAPSULE ORAL ONCE
Status: COMPLETED | OUTPATIENT
Start: 2024-10-06 | End: 2024-10-06

## 2024-10-06 RX ORDER — SODIUM CHLORIDE 9 MG/ML
INJECTION, SOLUTION INTRAVENOUS CONTINUOUS
Status: DISCONTINUED | OUTPATIENT
Start: 2024-10-06 | End: 2024-10-06 | Stop reason: HOSPADM

## 2024-10-06 RX ORDER — LOPERAMIDE HCL 2 MG
2 CAPSULE ORAL 4 TIMES DAILY PRN
Qty: 20 CAPSULE | Refills: 0 | Status: SHIPPED | OUTPATIENT
Start: 2024-10-06 | End: 2024-10-16

## 2024-10-06 RX ORDER — LOPERAMIDE HCL 2 MG
2 CAPSULE ORAL 4 TIMES DAILY PRN
Status: DISCONTINUED | OUTPATIENT
Start: 2024-10-06 | End: 2024-10-06

## 2024-10-06 RX ORDER — 0.9 % SODIUM CHLORIDE 0.9 %
500 INTRAVENOUS SOLUTION INTRAVENOUS ONCE
Status: COMPLETED | OUTPATIENT
Start: 2024-10-06 | End: 2024-10-06

## 2024-10-06 RX ORDER — ONDANSETRON 4 MG/1
4 TABLET, ORALLY DISINTEGRATING ORAL 3 TIMES DAILY PRN
Qty: 21 TABLET | Refills: 0 | Status: SHIPPED | OUTPATIENT
Start: 2024-10-06

## 2024-10-06 RX ORDER — ONDANSETRON 2 MG/ML
4 INJECTION INTRAMUSCULAR; INTRAVENOUS EVERY 6 HOURS PRN
Status: DISCONTINUED | OUTPATIENT
Start: 2024-10-06 | End: 2024-10-06 | Stop reason: HOSPADM

## 2024-10-06 RX ADMIN — SODIUM CHLORIDE 500 ML: 9 INJECTION, SOLUTION INTRAVENOUS at 08:11

## 2024-10-06 RX ADMIN — ONDANSETRON 4 MG: 2 INJECTION INTRAMUSCULAR; INTRAVENOUS at 07:37

## 2024-10-06 RX ADMIN — LOPERAMIDE HYDROCHLORIDE 2 MG: 2 CAPSULE ORAL at 08:10

## 2024-10-06 RX ADMIN — SODIUM CHLORIDE: 9 INJECTION, SOLUTION INTRAVENOUS at 07:37

## 2024-10-06 RX ADMIN — SODIUM CHLORIDE 500 ML: 9 INJECTION, SOLUTION INTRAVENOUS at 07:36

## 2024-10-06 ASSESSMENT — PAIN SCALES - GENERAL: PAINLEVEL_OUTOF10: 0

## 2024-10-06 ASSESSMENT — PAIN - FUNCTIONAL ASSESSMENT: PAIN_FUNCTIONAL_ASSESSMENT: 0-10

## 2024-10-06 NOTE — DISCHARGE INSTRUCTIONS
interactions with other medications, please call your primary care provider or contact us directly.  Again, THANK YOU for choosing us to care for YOU!

## 2024-10-06 NOTE — ED TRIAGE NOTES
Pt reports n/v/d throughout night. Pt states he was recently discharged from [place] on 10/4/2024 for mini stroke. Pt concerned for slurred speech starting at 0645 this am. Pt denies any pain, numbness, tingling at this time. Pt denies any other symptoms at this time. Pt reporting lasting nausea.

## 2024-10-06 NOTE — ED PROVIDER NOTES
ED Course as of 10/06/24 0934   [place] Oct 06, 2024   0808 Patient seen in signout benign abdomen.  KALPANA noted.  Has received 500 cc will give additional 500 cc bolus.  Patient denies blood in the stools.  Denies significant pain. []   0810 Creatinine(!): 1.39 []   0932 Patient feeling improved has gotten 1 L IV fluids.  Will treat symptomatically patient to monitor symptoms at home and return to the emergency department for any acute concerns or deterioration []      ED Course User Index  [] Stalin Paulino MD     [facility]  [facility] of [facility].  He feels significantly improved after IV fluids.  KALPANA noted on labs discussed with patient we will follow-up with primary care doctor.  Abdomen soft benign nontender.  Discussed return precautions and follow-up     Stalin Paulino MD  10/06/24 0934    
Alert  LOC Questions (1b): Answers both correctly  LOC Commands (1c): Performs both tasks correctly  Best Gaze (2): Normal  Visual (3): No visual loss  Facial Palsy (4): Normal symmetrical movement  Motor Arm, Left (5a): No drift  Motor Arm, Right (5b): No drift  Motor Leg, Left (6a): No drift  Motor Leg, Right (6b): No drift  Limb Ataxia (7): Absent  Sensory (8): Normal  Best Language (9): No aphasia  Dysarthria (10): Normal  Extinction and Inattention (11): No abnormality  Total: 0     Serena Coma Scale  Eye Opening: Spontaneous  Best Verbal Response: Oriented  Best Motor Response: Obeys commands  [place] Coma Scale Score: 15                     CIWA Assessment  BP: (!) 141/82  Pulse: 97                 PHYSICAL EXAM       ED Triage Vitals [10/06/24 0729]   BP Systolic BP Percentile Diastolic BP Percentile Temp Temp src Pulse Respirations SpO2   (!) 141/82 -- -- 97 °F (36.1 °C) -- 97 20 99 %      Height Weight - Scale         1.676 m (5' 6\") 72.1 kg (159 lb)             Physical Exam  Vitals and nursing note reviewed.   HENT:      Head: Normocephalic and atraumatic.      Nose: Nose normal.   Eyes:      Extraocular Movements: Extraocular movements intact.      Pupils: Pupils are equal, round, and reactive to light.   Cardiovascular:      Rate and Rhythm: Normal rate and regular rhythm.   Pulmonary:      Breath sounds: Normal breath sounds.   Abdominal:      General: Abdomen is flat. Bowel sounds are normal.      Palpations: Abdomen is soft.   Musculoskeletal:         General: Normal range of motion.      Cervical back: Normal range of motion and neck supple.   Skin:     General: Skin is warm.   Neurological:      General: No focal deficit present.      Mental Status: He is alert.   Psychiatric:         Mood and Affect: Mood normal.         DIAGNOSTIC RESULTS     EKG: All EKG's are interpreted by the Emergency Department Physician who either signs or Co-signs this chart in the absence of a cardiologist.    EKG :

## 2024-10-08 LAB
EKG ATRIAL RATE: 75 BPM
EKG DIAGNOSIS: NORMAL
EKG P AXIS: 70 DEGREES
EKG P-R INTERVAL: 150 MS
EKG Q-T INTERVAL: 386 MS
EKG QRS DURATION: 84 MS
EKG QTC CALCULATION (BAZETT): 440 MS
EKG R AXIS: 48 DEGREES
EKG T AXIS: 18 DEGREES
EKG VENTRICULAR RATE: 78 BPM

## 2024-10-14 ENCOUNTER — APPOINTMENT (OUTPATIENT)
Facility: HOSPITAL | Age: 64
End: 2024-10-14
Payer: OTHER GOVERNMENT

## 2024-10-22 ENCOUNTER — HOSPITAL ENCOUNTER (OUTPATIENT)
Facility: HOSPITAL | Age: 64
Setting detail: RECURRING SERIES
Discharge: HOME | End: 2024-10-25
Payer: OTHER GOVERNMENT

## 2024-10-22 PROCEDURE — 97161 PT EVAL LOW COMPLEX 20 MIN: CPT

## 2024-10-22 PROCEDURE — 97165 OT EVAL LOW COMPLEX 30 MIN: CPT

## 2024-10-22 PROCEDURE — 92522 EVALUATE SPEECH PRODUCTION: CPT

## 2024-10-22 NOTE — THERAPY EVALUATION
[facility]  [address] [place] [address]  Ph: [phone]     Fax: [phone]         OCCUPATIONAL THERAPY - EVALUATION / DISCHARGE SUMMARY (updated 3/23)      Date of Service: 10/22/2024          Patient Name:  Dante Hwang :  1960   Medical   Diagnosis:  CVA (cerebral vascular accident) (HCC) [I63.9] Treatment Diagnosis:  M62.81  GENERAL MUSCLE WEAKNESS    Referral Source:  Alysa Herring FNP Provider #:  [id number]                Insurance: Payor: [employer] / Plan: [employer] / Product Type: *No Product type* /    [x] Patient's date of birth verified      Visit #   Current  / Total 1 1   Time   In / Out 1305 1330   Total Treatment Time 25 minutes   Total Timed Codes 25 minutes     SUBJECTIVE  Pain Level (0-10 scale): 0/10  Changes in pain since onset: No change since onset    Any medication changes, allergies to medications, adverse drug reactions, diagnosis change, or new procedure performed?: [x] No    [] Yes (see summary sheet for update)  Medications: Verified on Patient Summary List    Subjective functional status/changes:      Patient is 64 y.o. -American male who presents for occupational therapy evaluation following CVA in 10/4/2024. Patient reports he was cleared by therapists in the hospital and had no needs.     Onset Date: 10/4/2024  Start of Care: 10/22/2024  PLOF: Independent with all ADL's   Mechanism of Injury:  CVA  Previous Treatment/Compliance: No previous treatment  Radiographs: xray  What increases symptoms: n/a  What decreases symptoms: n/a  Work Hx: Patient is currently working at [employer] at .  Living Situation: Patient lives with wife in one story home.  Pt Goals: \"Make sure I don't need therapy.\"  Motivation: Excellent  Substance use: None Reported  Cognition: Alert and Oriented x 4  Fall Assessment: No falls risk assessment indicated at this time  PMHx:  Past Medical History:   Diagnosis Date

## 2024-10-22 NOTE — THERAPY EVALUATION
[facility]  [address] [place] [address]  Ph: [phone]     Fax: [phone]     SPEECH LANGUAGE PATHOLOGY - MEDICARE EVALUATION/PLAN OF CARE NOTE     Date of Service: 10/22/2024        Patient Name:  Dante Hwang :  1960   Medical   Diagnosis:  CVA (cerebral vascular accident) (HCC) [I63.9] Treatment Diagnosis:  Dysarthria following cerebral infarction   Referral Source:  Alysa Herring FNP Provider #:  [id number]                Insurance: Payor: [facility] / Plan: [employer] / Product Type: *No Product type* /    [x] Patient's date of birth verified      Visit #   Current  / Total 1 1   Time   In / Out 1125 1205   Total Treatment Time 40 minutes   Total Timed Codes 40 minutes   1:1 Treatment Time 40 minutes      [facility] Totals Reminder:  bill using total billable   min of TIMED therapeutic procedures and modalities.   8-22 min = 1 unit; 23-37 min = 2 units; 38-52 min = 3 units;  53-67 min = 4 units; 68-82 min = 5 units     SUBJECTIVE  Pain Level: 0/10  Any medication changes, allergies to medications, adverse drug reactions, diagnosis change, or new procedure performed?: [x] No    [x] Yes (see summary sheet for update)  Medications: Verified on Patient Summary List    Subjective functional status/changes:     Patient is 64 y.o.  male who presents for speech therapy evaluation following recent hospitalization for acute infarct to the right basal ganglia.  Patient began symptoms began morning of 10/2 when he noticed slurred speech upon waking.  He had an appointment with his rheumoatologist later that morning and noticed he was also having difficulty maintaining focus and balance while driving.  He was sent to [facility] for CVA work up where he was found to have an acute infarct.  By the time his wife joined him at the hospital, most of his symptoms had resolved.  All therapy disciplines signed off for him during his inpatient stay.  He

## 2024-10-22 NOTE — THERAPY EVALUATION
PM        ===================================================================  I certify that the above Therapy Services are being furnished while the patient is under my care. I agree with the treatment plan and certify that this therapy is necessary.    Physician's Signature:_________________________   DATE:_________   TIME:________                           Alysa Herring FNP    ** Signature, Date and Time must be completed for valid certification **  Please sign and fax to [phone].  Thank you      Patient Name:  Dante Hwang :  1960

## 2024-10-30 ENCOUNTER — HOSPITAL ENCOUNTER (OUTPATIENT)
Facility: HOSPITAL | Age: 64
Setting detail: RECURRING SERIES
Discharge: HOME | End: 2024-11-02
Payer: OTHER GOVERNMENT

## 2024-10-30 PROCEDURE — 97110 THERAPEUTIC EXERCISES: CPT

## 2024-10-30 NOTE — PROGRESS NOTES
PHYSICAL THERAPY - DAILY TREATMENT NOTE (updated 3/23)    Date of Service: 10/30/2024        Patient Name:  Dante Hwang :  1960   Medical   Diagnosis:  CVA (cerebral vascular accident) ([facility]) [I63.9] Treatment Diagnosis:  R26.89   Abnormalities of gait and mobility and R27.9     Unspecified lack of coordination    Referral Source:  Alysa Herring, LIOR Insurance:   Payor: [facility] / Plan: [facility] / Product Type: *No Product type* /     [x] Patient's date of birth verified                Visit #   Current  / Total 2 6   Time   In / Out 1400 1459   Total Treatment Time (in minutes) 59    Total Timed Codes  (in minutes) 59    [facility] Totals Reminder:    8-22 min = 1 unit; 23-37 min = 2 units; 38-52 min = 3 units;  53-67 min = 4 units; 68-82 min = 5 units      SUBJECTIVE  Pain Level (0-10 scale): 0/10    Any medication changes, allergies to medications, adverse drug reactions, diagnosis change, or new procedure performed?: No  Medications: [x]  Verified on Patient Summary List    Subjective functional status/changes:     \"PT reports no fall or issues since last visit.\"    OBJECTIVE  Therapeutic Procedures:  Tx Min Billable or 1:1 Min (if diff from Tx Min) Procedure, Rationale, Specifics   59 59 [id number] Therapeutic Exercise (timed):  increase ROM, strength, coordination, balance, and proprioception to improve patient's ability to progress to PLOF and address remaining functional goals. (see flow sheet as applicable)        59 59    Total Total   [x] Patient Education billed concurrently with other procedures        Pain Level at end of session (0-10 scale): 0/10    Assessment   Patient tolerated treatment working on basic LE strengthening and balance. Pt starting initial ex and did well with all tasks some soreness in mm noted however pt did well with balance only have minimal unsteadiness at times and able to self correct. Pt will continue to increased balance tasks as POC notes. .   Patient will continue to

## 2024-11-05 ENCOUNTER — HOSPITAL ENCOUNTER (OUTPATIENT)
Facility: HOSPITAL | Age: 64
Setting detail: RECURRING SERIES
Discharge: HOME | End: 2024-11-08
Payer: OTHER GOVERNMENT

## 2024-11-05 PROCEDURE — 97110 THERAPEUTIC EXERCISES: CPT

## 2024-11-05 NOTE — PROGRESS NOTES
to benefit from skilled PT services to modify and progress therapeutic interventions, analyze and address functional mobility deficits, analyze and address ROM deficits, analyze and address strength deficits, analyze and address soft tissue restrictions, and analyze and cue for proper movement patterns to address functional deficits and attain remaining goals.    Progress toward goals / Updated goals:    PRECAUTIONS:  None at this time        Date of Initial Evaluation: 10/22/2024  Date of last Progress Note: n/a  Visit # of last Progress Note: 1      Number of Insurance Authorized visits: Not Applicable         Therapeutic Exercise Flow Sheet:                                                                                    Running Visit #    EXERCISE   1   2   3   4   5   6   7   8   9   10      Nustep      Lvl 3 x 10'     Lvl 3 x 12 min                  3 way hip ex   2# x 20 ea + march 2# x 20 ea + march/HS curl             Tandem Stance    Foam  EO vs EC        2/30\" ea     2/30\" ea                    SLS          2/30\"    2/30\" B                    Tandem Walk          2# x 2' + side stepping  + side stepping  2 min ea                     Balance to trampoline Toss                                   Walk with Head Turns                                   Leg Press         Apollo 10# x 20     20# x20                     Apollo HS Curl         10# x 20 B 20# x 20 B                     Apollo Leg Ext      10# x 20 B    10# x 20 B                     Cone Step Overs         Hurdles x 2'-2#                       Walk Ball Toss                          Stairs      2 min                   LAQ   2# x20 B           [x] Eval only for Running Visit #1, no treatment provided     Modalities to be included future treatment sessions: TBD  Has HEP been provided to patient? [x] No    [] Yes                                      Access Code:                    Date Provided:  [] Reviewed HEP    [] Progressed/Changed HEP, details:

## 2024-11-06 ENCOUNTER — APPOINTMENT (OUTPATIENT)
Facility: HOSPITAL | Age: 64
End: 2024-11-06
Payer: OTHER GOVERNMENT

## 2024-11-06 ENCOUNTER — OFFICE VISIT (OUTPATIENT)
Age: 64
End: 2024-11-06
Payer: OTHER GOVERNMENT

## 2024-11-06 VITALS
HEART RATE: 84 BPM | SYSTOLIC BLOOD PRESSURE: 148 MMHG | OXYGEN SATURATION: 99 % | RESPIRATION RATE: 16 BRPM | HEIGHT: 66 IN | WEIGHT: 161.2 LBS | BODY MASS INDEX: 25.91 KG/M2 | DIASTOLIC BLOOD PRESSURE: 64 MMHG

## 2024-11-06 DIAGNOSIS — Z87.891 FORMER CIGAR SMOKER: ICD-10-CM

## 2024-11-06 DIAGNOSIS — Z79.02 LONG TERM (CURRENT) USE OF ANTITHROMBOTICS/ANTIPLATELETS: ICD-10-CM

## 2024-11-06 DIAGNOSIS — Z86.73 HISTORY OF STROKE: ICD-10-CM

## 2024-11-06 DIAGNOSIS — Z09 HOSPITAL DISCHARGE FOLLOW-UP: Primary | ICD-10-CM

## 2024-11-06 PROCEDURE — 99204 OFFICE O/P NEW MOD 45 MIN: CPT

## 2024-11-06 PROCEDURE — 3077F SYST BP >= 140 MM HG: CPT

## 2024-11-06 PROCEDURE — 3078F DIAST BP <80 MM HG: CPT

## 2024-11-06 ASSESSMENT — PATIENT HEALTH QUESTIONNAIRE - PHQ9
SUM OF ALL RESPONSES TO PHQ QUESTIONS 1-9: 0
SUM OF ALL RESPONSES TO PHQ QUESTIONS 1-9: 0
SUM OF ALL RESPONSES TO PHQ9 QUESTIONS 1 & 2: 0
2. FEELING DOWN, DEPRESSED OR HOPELESS: NOT AT ALL
SUM OF ALL RESPONSES TO PHQ QUESTIONS 1-9: 0
SUM OF ALL RESPONSES TO PHQ QUESTIONS 1-9: 0
1. LITTLE INTEREST OR PLEASURE IN DOING THINGS: NOT AT ALL

## 2024-11-06 ASSESSMENT — ENCOUNTER SYMPTOMS
BLOOD IN STOOL: 0
DIARRHEA: 1
ANAL BLEEDING: 0

## 2024-11-06 NOTE — PROGRESS NOTES
before driving. He understands. He will follow up with our clinic prn.     Plan:  Continue ASA 81mg  Complete Plavix for duration of therapy (total 21 days)  Continue PT  F/u PCP to optimize management of HTN, HLD, T2DM  F/u here prn      MARISA BAIN - NP

## 2024-11-12 ENCOUNTER — HOSPITAL ENCOUNTER (OUTPATIENT)
Facility: HOSPITAL | Age: 64
Setting detail: RECURRING SERIES
Discharge: HOME | End: 2024-11-15
Payer: OTHER GOVERNMENT

## 2024-11-12 PROCEDURE — 97112 NEUROMUSCULAR REEDUCATION: CPT

## 2024-11-12 PROCEDURE — 97110 THERAPEUTIC EXERCISES: CPT

## 2024-11-12 NOTE — PROGRESS NOTES
PHYSICAL THERAPY - DAILY TREATMENT NOTE (updated 3/23)    Date of Service: 2024        Patient Name:  Dante Hwang :  1960   Medical   Diagnosis:  CVA (cerebral vascular accident) ([facility]) [I63.9] Treatment Diagnosis:  R26.89   Abnormalities of gait and mobility    Referral Source:  Alysa Herring FNP Insurance:   Payor: [facility] / Plan: [facility] / Product Type: *No Product type* /     [x] Patient's date of birth verified                Visit #   Current  / Total 4 6   Time   In / Out 1354 1451   Total Treatment Time (in minutes) 57    Total Timed Codes  (in minutes) 57    [facility] Totals Reminder:    8-22 min = 1 unit; 23-37 min = 2 units; 38-52 min = 3 units;  53-67 min = 4 units; 68-82 min = 5 units    SUBJECTIVE  Pain Level (0-10 scale): 0/10    Any medication changes, allergies to medications, adverse drug reactions, diagnosis change, or new procedure performed?: No  Medications: [x]  Verified on Patient Summary List    Subjective functional status/changes:     \"I told the dr I wasn't going back to work - I'm going to be retired right now.\"    OBJECTIVE  Therapeutic Procedures:  Tx Min Billable or 1:1 Min (if diff from Tx Min) Procedure, Rationale, Specifics   32  [id number] Therapeutic Exercise (timed):  increase ROM, strength, coordination, balance, and proprioception to improve patient's ability to progress to PLOF and address remaining functional goals. (see flow sheet as applicable)   25  53189 Neuromuscular Re-Education (timed):  improve balance, coordination, kinesthetic sense, posture, core stability and proprioception to improve patient's ability to develop conscious control of individual muscles and awareness of position of extremities in order to progress to PLOF and address remaining functional goals. (see flow sheet as applicable)   57     Total Total   [x] Patient Education billed concurrently with other procedures    Pain Level at end of session (0-10 scale): 0/10    Assessment

## 2024-11-14 ENCOUNTER — HOSPITAL ENCOUNTER (OUTPATIENT)
Facility: HOSPITAL | Age: 64
Setting detail: RECURRING SERIES
Discharge: HOME | End: 2024-11-17
Payer: OTHER GOVERNMENT

## 2024-11-14 PROCEDURE — 97110 THERAPEUTIC EXERCISES: CPT

## 2024-11-14 PROCEDURE — 97112 NEUROMUSCULAR REEDUCATION: CPT

## 2024-11-14 NOTE — PROGRESS NOTES
PHYSICAL THERAPY - DAILY TREATMENT NOTE (updated 3/23)    Date of Service: 2024        Patient Name:  Dante Hwang :  1960   Medical   Diagnosis:  CVA (cerebral vascular accident) ([facility]) [I63.9] Treatment Diagnosis:  M62.81  GENERAL MUSCLE WEAKNESS, R26.81   Unsteadiness on feet, and R26.89   Abnormalities of gait and mobility    Referral Source:  Alysa Herring FNP Insurance:   Payor: [facility] / Plan: [facility] / Product Type: *No Product type* /     [x] Patient's date of birth verified                Visit #   Current  / Total 5 6   Time   In / Out 1400  1455   Total Treatment Time (in minutes) 55    Total Timed Codes  (in minutes) 55    [facility] Totals Reminder:    8-22 min = 1 unit; 23-37 min = 2 units; 38-52 min = 3 units;  53-67 min = 4 units; 68-82 min = 5 units      SUBJECTIVE  Pain Level (0-10 scale): 0/10    Any medication changes, allergies to medications, adverse drug reactions, diagnosis change, or new procedure performed?: No  Medications: [x]  Verified on Patient Summary List    Subjective functional status/changes:     \"Pt reports no pain he does however get tired by the end of the ex. .\"    OBJECTIVE  Therapeutic Procedures:  Tx Min Billable or 1:1 Min (if diff from Tx Min) Procedure, Rationale, Specifics   30 30 73324 Therapeutic Exercise (timed):  increase ROM, strength, coordination, balance, and proprioception to improve patient's ability to progress to PLOF and address remaining functional goals. (see flow sheet as applicable)   25 25 [id number] Neuromuscular Re-Education (timed):  improve balance, coordination, kinesthetic sense, posture, core stability and proprioception to improve patient's ability to develop conscious control of individual muscles and awareness of position of extremities in order to progress to PLOF and address remaining functional goals. (see flow sheet as applicable)   55 55    Total Total   [x] Patient Education billed concurrently with other

## 2024-11-19 ENCOUNTER — HOSPITAL ENCOUNTER (OUTPATIENT)
Facility: HOSPITAL | Age: 64
Setting detail: RECURRING SERIES
Discharge: HOME | End: 2024-11-22
Payer: OTHER GOVERNMENT

## 2024-11-19 PROCEDURE — 97110 THERAPEUTIC EXERCISES: CPT

## 2024-11-19 NOTE — THERAPY DISCHARGE
level balance tasks such as SLS for > 10 sec B to demonstrate improved balance for navigating stairs without UE support.   Status at last Eval/Progress Note: SLS: R 10 seconds  L 5 seconds  Current Status: SLS: R 12 seconds  L 12 seconds  Goal Met?  Yes     2.  Patient will be able to perform tasks for at least a few hours without needing to stop secondary to fatigue/lack of endurance.  Status at last Eval/Progress Note: takes more frequent breaks  Current Status: improving in time before breaks needed  Goal Met?Yes    RECOMMENDATIONS  Discontinue therapy. Progressing towards or have reached established goals.    Mila Peacock, PT, DPT       2024       2:39 PM    _____________________________________________________________________________________________________________________    ___ I have read the above report and request that my patient be discharged from therapy.     Physician's Signature:_________________________   DATE:_________   TIME:________                           Alysa Herring FNP    ** Signature, Date and Time must be completed for valid certification **  Please sign and fax to  [phone].  Thank you       Patient Name:  Dante Hwang :  1960

## 2024-11-19 NOTE — PROGRESS NOTES
PHYSICAL THERAPY - DAILY TREATMENT NOTE (updated 3/23)    Date of Service: 2024        Patient Name:  Dante Hwang :  1960   Medical   Diagnosis:  CVA (cerebral vascular accident) ([facility]) [I63.9] Treatment Diagnosis:  M62.81  GENERAL MUSCLE WEAKNESS, R26.81   Unsteadiness on feet, and R26.89   Abnormalities of gait and mobility    Referral Source:  Alysa Herring FNP Insurance:   Payor: [facility] / Plan: [facility] / Product Type: *No Product type* /     [x] Patient's date of birth verified                Visit #   Current  / Total 6 6   Time   In / Out 1357  1427   Total Treatment Time (in minutes) 30    Total Timed Codes  (in minutes) 30    [facility] Totals Reminder:    8-22 min = 1 unit; 23-37 min = 2 units; 38-52 min = 3 units;  53-67 min = 4 units; 68-82 min = 5 units      SUBJECTIVE  Pain Level (0-10 scale): 0/10    Any medication changes, allergies to medications, adverse drug reactions, diagnosis change, or new procedure performed?: No  Medications: [x]  Verified on Patient Summary List    Subjective functional status/changes:     \"I am a little tired today.\"    OBJECTIVE  Therapeutic Procedures:  Tx Min Billable or 1:1 Min (if diff from Tx Min) Procedure, Rationale, Specifics   30 30 [id number] Therapeutic Exercise (timed):  increase ROM, strength, coordination, balance, and proprioception to improve patient's ability to progress to PLOF and address remaining functional goals. (see flow sheet as applicable)   30 30    Total Total   [x] Patient Education billed concurrently with other procedures    Gait and Functional Mobility:  WFL     Lower Extremity Testing            MMT AROM     Right Left Right Left   Hip   Hip Flexion     5/5 5/5 WFL WFL   Hip Abduction    5/5 5/5 WFL WFL   Hip Adduction   5/5 5/5 WFL WFL   Knee   Knee Extension 5/5 4+/5 WFL WFL   Knee Flexion   5/5 4+/5 WFL WFL   Ankle   Ankle PF 5/5 5/5 WFL WFL   Ankle DF 5/5 5/5 WFL WFL      Balance Testing  Tandem Stance: EO R front 30

## 2024-11-21 ENCOUNTER — APPOINTMENT (OUTPATIENT)
Facility: HOSPITAL | Age: 64
End: 2024-11-21
Payer: OTHER GOVERNMENT

## 2025-02-05 ENCOUNTER — TRANSCRIBE ORDERS (OUTPATIENT)
Facility: HOSPITAL | Age: 65
End: 2025-02-05

## 2025-02-05 DIAGNOSIS — R10.10 UPPER ABDOMINAL PAIN: Primary | ICD-10-CM

## 2025-05-12 ENCOUNTER — HOSPITAL ENCOUNTER (OUTPATIENT)
Facility: HOSPITAL | Age: 65
Setting detail: SPECIMEN
Discharge: HOME | End: 2025-05-15
Payer: OTHER GOVERNMENT

## 2025-05-12 ENCOUNTER — HOSPITAL ENCOUNTER (OUTPATIENT)
Facility: HOSPITAL | Age: 65
Discharge: HOME | End: 2025-05-15
Payer: OTHER GOVERNMENT

## 2025-05-12 DIAGNOSIS — R10.10 UPPER ABDOMINAL PAIN: ICD-10-CM

## 2025-05-12 LAB — CREAT SERPL-MCNC: 1.25 MG/DL (ref 0.7–1.3)

## 2025-05-12 PROCEDURE — 82565 ASSAY OF CREATININE: CPT

## 2025-05-12 PROCEDURE — 6360000004 HC RX CONTRAST MEDICATION: Performed by: NURSE PRACTITIONER

## 2025-05-12 PROCEDURE — 36415 COLL VENOUS BLD VENIPUNCTURE: CPT

## 2025-05-12 PROCEDURE — 74177 CT ABD & PELVIS W/CONTRAST: CPT

## 2025-05-12 RX ORDER — DIATRIZOATE MEGLUMINE AND DIATRIZOATE SODIUM 660; 100 MG/ML; MG/ML
30 SOLUTION ORAL; RECTAL
Status: DISCONTINUED | OUTPATIENT
Start: 2025-05-12 | End: 2025-05-16 | Stop reason: HOSPADM

## 2025-05-12 RX ORDER — IOPAMIDOL 755 MG/ML
100 INJECTION, SOLUTION INTRAVASCULAR
Status: COMPLETED | OUTPATIENT
Start: 2025-05-12 | End: 2025-05-12

## 2025-05-12 RX ADMIN — DIATRIZOATE MEGLUMINE AND DIATRIZOATE SODIUM 30 ML: 660; 100 LIQUID ORAL; RECTAL at 10:39

## 2025-05-12 RX ADMIN — IOPAMIDOL 100 ML: 755 INJECTION, SOLUTION INTRAVENOUS at 10:39

## 2025-05-19 ENCOUNTER — HOSPITAL ENCOUNTER (EMERGENCY)
Facility: HOSPITAL | Age: 65
Discharge: HOME | End: 2025-05-19
Attending: FAMILY MEDICINE
Payer: OTHER GOVERNMENT

## 2025-05-19 VITALS
HEART RATE: 66 BPM | DIASTOLIC BLOOD PRESSURE: 84 MMHG | SYSTOLIC BLOOD PRESSURE: 160 MMHG | OXYGEN SATURATION: 98 % | TEMPERATURE: 97.8 F | RESPIRATION RATE: 17 BRPM

## 2025-05-19 DIAGNOSIS — S92.425B OPEN NONDISPLACED FRACTURE OF DISTAL PHALANX OF LEFT GREAT TOE, INITIAL ENCOUNTER: Primary | ICD-10-CM

## 2025-05-19 PROCEDURE — 2500000003 HC RX 250 WO HCPCS: Performed by: FAMILY MEDICINE

## 2025-05-19 PROCEDURE — 6360000002 HC RX W HCPCS: Performed by: FAMILY MEDICINE

## 2025-05-19 PROCEDURE — 96372 THER/PROPH/DIAG INJ SC/IM: CPT

## 2025-05-19 PROCEDURE — 99284 EMERGENCY DEPT VISIT MOD MDM: CPT

## 2025-05-19 RX ORDER — SULFAMETHOXAZOLE AND TRIMETHOPRIM 800; 160 MG/1; MG/1
1 TABLET ORAL 2 TIMES DAILY
Qty: 20 TABLET | Refills: 0 | Status: SHIPPED | OUTPATIENT
Start: 2025-05-19 | End: 2025-05-29

## 2025-05-19 RX ADMIN — WATER 1000 MG: 1 INJECTION INTRAMUSCULAR; INTRAVENOUS; SUBCUTANEOUS at 17:29

## 2025-05-19 ASSESSMENT — PAIN - FUNCTIONAL ASSESSMENT: PAIN_FUNCTIONAL_ASSESSMENT: 0-10

## 2025-05-19 ASSESSMENT — PAIN SCALES - GENERAL: PAINLEVEL_OUTOF10: 0

## 2025-05-19 ASSESSMENT — LIFESTYLE VARIABLES
HOW OFTEN DO YOU HAVE A DRINK CONTAINING ALCOHOL: NEVER
HOW MANY STANDARD DRINKS CONTAINING ALCOHOL DO YOU HAVE ON A TYPICAL DAY: PATIENT DOES NOT DRINK

## 2025-05-19 NOTE — ED TRIAGE NOTES
PT reports he had an area lanced on his left great toe PTA, pt reports he was sent for antibiotics , possibly IV if needed. PT voices no complaints at this time

## 2025-05-23 NOTE — ED PROVIDER NOTES
[facility] EMERGENCY DEPT  EMERGENCY DEPARTMENT HISTORY AND PHYSICAL EXAM      Date: 5/19/2025  Patient Name: Dante Hwang  MRN: [id number]  Birthdate 1960  Date of evaluation: 5/19/2025  Provider: Dante Silva MD   Note Started: 10:40 AM EDT 5/23/25    HISTORY OF PRESENT ILLNESS     Chief Complaint   Patient presents with    IV Medication       History Provided By: Patient    HPI: Dante Hwang is a 65 y.o. male presents to the ED ambulatory send from local podiatrist office, podiatrist called me prior to the patient arriving she had released a subungual hematoma on a open fracture to the left great toe, she had done an x-ray there at her office she is sending the patient to the ED for evaluation for possible IV dose of antibiotics he does have a history of diabetes and peripheral vascular disease, the injury happened 2 days ago when he dropped a large board on his foot, the foot was not in the dirt and the wound apparently was not contaminated at that time his tetanus shot is up-to-date    PAST MEDICAL HISTORY   Past Medical History:  Past Medical History:   Diagnosis Date    Cerebral artery occlusion with cerebral infarction ([facility])     Diabetes ([facility])     ED (erectile dysfunction) 5/25/2010    Hypertension     Neuropathy     RA (rheumatoid arthritis) ([facility]) 5/25/2010       Past Surgical History:  History reviewed. No pertinent surgical history.    Family History:  Family History   Problem Relation Age of Onset    No Known Problems Mother     Rheum Arthritis Father        Social History:  Social History     Tobacco Use    Smoking status: Former     Current packs/day: 0.00     Types: Cigarettes, Cigars     Quit date: 10/10/2009     Years since quitting: 15.6    Smokeless tobacco: Never   Substance Use Topics    Alcohol use: Not Currently    Drug use: No       Allergies:  No Known Allergies    PCP: Alysa Herring FNP    Current Meds:   No current facility-administered medications for this